# Patient Record
Sex: FEMALE | Race: WHITE | NOT HISPANIC OR LATINO | Employment: FULL TIME | URBAN - METROPOLITAN AREA
[De-identification: names, ages, dates, MRNs, and addresses within clinical notes are randomized per-mention and may not be internally consistent; named-entity substitution may affect disease eponyms.]

---

## 2018-07-09 ENCOUNTER — HISTORICAL (OUTPATIENT)
Dept: RADIOLOGY | Facility: HOSPITAL | Age: 48
End: 2018-07-09

## 2019-10-03 ENCOUNTER — OFFICE VISIT (OUTPATIENT)
Dept: ORTHOPEDICS | Facility: CLINIC | Age: 49
End: 2019-10-03
Payer: MEDICAID

## 2019-10-03 VITALS — TEMPERATURE: 98 F | BODY MASS INDEX: 30.36 KG/M2 | WEIGHT: 165 LBS | HEIGHT: 62 IN

## 2019-10-03 DIAGNOSIS — M77.11 LATERAL EPICONDYLITIS OF RIGHT ELBOW: Primary | ICD-10-CM

## 2019-10-03 DIAGNOSIS — G56.01 CARPAL TUNNEL SYNDROME OF RIGHT WRIST: ICD-10-CM

## 2019-10-03 PROCEDURE — 20551 TENDON ORIGIN: R ELBOW: ICD-10-PCS | Mod: 51,RT,S$GLB, | Performed by: ORTHOPAEDIC SURGERY

## 2019-10-03 PROCEDURE — 20551 NJX 1 TENDON ORIGIN/INSJ: CPT | Mod: 51,RT,S$GLB, | Performed by: ORTHOPAEDIC SURGERY

## 2019-10-03 PROCEDURE — 99202 OFFICE O/P NEW SF 15 MIN: CPT | Mod: 25,S$GLB,, | Performed by: ORTHOPAEDIC SURGERY

## 2019-10-03 PROCEDURE — 99202 PR OFFICE/OUTPT VISIT, NEW, LEVL II, 15-29 MIN: ICD-10-PCS | Mod: 25,S$GLB,, | Performed by: ORTHOPAEDIC SURGERY

## 2019-10-03 PROCEDURE — 20526 CARPAL TUNNEL: R RADIOCARPAL: ICD-10-PCS | Mod: 59,RT,S$GLB, | Performed by: ORTHOPAEDIC SURGERY

## 2019-10-03 PROCEDURE — 20526 THER INJECTION CARP TUNNEL: CPT | Mod: 59,RT,S$GLB, | Performed by: ORTHOPAEDIC SURGERY

## 2019-10-03 RX ORDER — ROPINIROLE 0.5 MG/1
0.5 TABLET, FILM COATED ORAL NIGHTLY
COMMUNITY
Start: 2019-09-30 | End: 2023-04-20

## 2019-10-03 RX ORDER — B-COMPLEX WITH VITAMIN C
1 TABLET ORAL DAILY
COMMUNITY
End: 2020-01-23

## 2019-10-03 RX ORDER — SERTRALINE HYDROCHLORIDE 100 MG/1
TABLET, FILM COATED ORAL
COMMUNITY
Start: 2019-09-23 | End: 2020-01-23

## 2019-10-03 RX ORDER — ATORVASTATIN CALCIUM 10 MG/1
TABLET, FILM COATED ORAL
COMMUNITY
Start: 2019-09-16 | End: 2020-01-23

## 2019-10-03 RX ORDER — QUETIAPINE FUMARATE 200 MG/1
TABLET, FILM COATED ORAL
COMMUNITY
Start: 2019-09-23 | End: 2020-01-23

## 2019-10-03 RX ORDER — NAPROXEN 500 MG/1
500 TABLET ORAL 2 TIMES DAILY
COMMUNITY
End: 2020-01-23

## 2019-10-03 RX ORDER — PROPRANOLOL HYDROCHLORIDE 10 MG/1
TABLET ORAL
COMMUNITY
Start: 2019-09-04

## 2019-10-03 RX ORDER — LISINOPRIL 10 MG/1
TABLET ORAL
COMMUNITY
Start: 2019-09-27 | End: 2020-01-23

## 2019-10-03 NOTE — PROGRESS NOTES
Subjective:      Patient ID: Sera Osuna is a 48 y.o. female.    Chief Complaint: Pain of the Right Wrist    HPI 48-year-old lady who comes in with right lateral elbow pain as well as numbness in the right hand.  She has EMG and nerve conduction studies compatible with carpal tunnel syndrome this is been going on for several months.  She has been wearing a splint without relief    Review of Systems   Constitution: Negative for fever and weight loss.   Cardiovascular: Positive for leg swelling. Negative for chest pain.   Musculoskeletal: Positive for joint pain. Negative for arthritis, joint swelling, muscle weakness and stiffness.   Gastrointestinal: Negative for change in bowel habit.   Genitourinary: Negative for bladder incontinence and hematuria.   Neurological: Positive for numbness, paresthesias and sensory change. Negative for focal weakness.         Objective:                        Right Hand/Wrist Exam     Inspection   Scars: Wrist - absent   Effusion: Wrist - absent     Range of Motion     Wrist   Extension: normal   Flexion: normal   Abduction: normal  Adduction: normal    Tests   Phalens sign: positive  Carpal Tunnel Compression Test: positive      Other     Neuorologic Exam    Median Distribution: abnormal      Right Elbow Exam     Inspection   Scars: absent  Effusion: absent    Pain   The patient exhibits pain of the extensor musculature and lateral epicondyle    Range of Motion   Extension: normal   Flexion: normal   Pronation: normal   Supination: normal     Tests   Tennis Elbow: mild    Other   Sensation: normal          Muscle Strength   Right Upper Extremity   Wrist extension: 5/5/5   Wrist flexion: 5/5/5   Elbow Pronation:  5/5   Elbow Supination:  5/5   Elbow Extension: 5/5  Elbow Flexion: 5/5              Assessment:       Encounter Diagnoses   Name Primary?    Lateral epicondylitis of right elbow Yes    Carpal tunnel syndrome of right wrist           Plan:       Sera was seen today  for pain.    Diagnoses and all orders for this visit:    Lateral epicondylitis of right elbow    Carpal tunnel syndrome of right wrist     The extensor origin of the right elbow and the right carpal tunnel or both injected today.  Return 1 month for recheck  Tendon Origin: R elbow  Date/Time: 10/3/2019 2:30 PM  Performed by: Jaret Adams MD  Authorized by: Jaret Adams MD     Location:  Elbow  Site:  R elbow  Prep: patient was prepped and draped in usual sterile fashion    Needle size:  25 G  Approach:  Anterolateral  Medications:  10 mg triamcinolone acetonide 10 mg/mL  Patient tolerance:  Patient tolerated the procedure well with no immediate complications  Carpal Tunnel: R radiocarpal  Date/Time: 10/3/2019 2:30 PM  Performed by: Jaret Adams MD  Authorized by: Jaret Adams MD       Location:  Wrist  Site:  R radiocarpal  Prep: Patient was prepped and draped in usual sterile fashion    Needle size:  25 G  Approach:  Volar  Medications:  10 mg triamcinolone acetonide 10 mg/mL  Patient tolerance:  Patient tolerated the procedure well with no immediate complications

## 2019-11-04 ENCOUNTER — OFFICE VISIT (OUTPATIENT)
Dept: ORTHOPEDICS | Facility: CLINIC | Age: 49
End: 2019-11-04
Payer: MEDICAID

## 2019-11-04 DIAGNOSIS — M77.11 LATERAL EPICONDYLITIS OF RIGHT ELBOW: Primary | ICD-10-CM

## 2019-11-04 DIAGNOSIS — G56.01 CARPAL TUNNEL SYNDROME OF RIGHT WRIST: ICD-10-CM

## 2019-11-04 PROCEDURE — 99213 PR OFFICE/OUTPT VISIT, EST, LEVL III, 20-29 MIN: ICD-10-PCS | Mod: S$GLB,,, | Performed by: ORTHOPAEDIC SURGERY

## 2019-11-04 PROCEDURE — 99213 OFFICE O/P EST LOW 20 MIN: CPT | Mod: S$GLB,,, | Performed by: ORTHOPAEDIC SURGERY

## 2019-11-04 NOTE — PROGRESS NOTES
Subjective:      Patient ID: Sera Osuna is a 48 y.o. female.    Chief Complaint: Pain of the Right Wrist    HPI patient is here for follow-up for her right carpal tunnel syndrome and lateral epicondylitis.  She states she is better following her injections.  The numbness has resolved in her hand although she still has some soreness in her palm.  Her right elbow pain is also better but is persistent    ROS  unchanged from prior visit  Objective:      Active and passive range of motion of her right elbow is normal.  She  with palpation of the extensor origin.    Examination of the right hand shows tenderness over the transverse carpal ligament.  She still has very slight decreased sensation in the median nerve distribution      Ortho/SPM Exam            Assessment:       Encounter Diagnoses   Name Primary?    Lateral epicondylitis of right elbow Yes    Carpal tunnel syndrome of right wrist           Plan:       Sera was seen today for pain.    Diagnoses and all orders for this visit:    Lateral epicondylitis of right elbow    Carpal tunnel syndrome of right wrist      She is placed on meloxicam.  She will contact if she fails to improve to arrange for another injection of her elbow and possible carpal tunnel release

## 2019-12-26 ENCOUNTER — OFFICE VISIT (OUTPATIENT)
Dept: ORTHOPEDICS | Facility: CLINIC | Age: 49
End: 2019-12-26
Payer: MEDICAID

## 2019-12-26 VITALS — BODY MASS INDEX: 30.52 KG/M2 | WEIGHT: 165.88 LBS | HEIGHT: 62 IN

## 2019-12-26 DIAGNOSIS — G56.01 CARPAL TUNNEL SYNDROME OF RIGHT WRIST: Primary | ICD-10-CM

## 2019-12-26 PROCEDURE — 99213 PR OFFICE/OUTPT VISIT, EST, LEVL III, 20-29 MIN: ICD-10-PCS | Mod: S$GLB,,, | Performed by: ORTHOPAEDIC SURGERY

## 2019-12-26 PROCEDURE — 99213 OFFICE O/P EST LOW 20 MIN: CPT | Mod: S$GLB,,, | Performed by: ORTHOPAEDIC SURGERY

## 2019-12-26 RX ORDER — CALCIUM CARBONATE/VITAMIN D3 500-10/5ML
400 LIQUID (ML) ORAL
COMMUNITY
Start: 2019-12-03 | End: 2020-01-23

## 2019-12-26 RX ORDER — QUETIAPINE FUMARATE 300 MG/1
TABLET, FILM COATED ORAL
COMMUNITY
Start: 2018-08-02 | End: 2023-04-20

## 2019-12-26 RX ORDER — FLUTICASONE PROPIONATE 50 MCG
SPRAY, SUSPENSION (ML) NASAL
COMMUNITY
Start: 2019-12-16

## 2019-12-26 RX ORDER — ONDANSETRON HYDROCHLORIDE 8 MG/1
TABLET, FILM COATED ORAL
COMMUNITY
Start: 2019-10-14

## 2019-12-26 RX ORDER — MELOXICAM 15 MG/1
TABLET ORAL
COMMUNITY
Start: 2019-11-05 | End: 2020-01-23

## 2019-12-26 RX ORDER — ATORVASTATIN CALCIUM 40 MG/1
40 TABLET, FILM COATED ORAL
COMMUNITY
Start: 2019-12-13 | End: 2020-01-23

## 2019-12-26 RX ORDER — PHENAZOPYRIDINE HYDROCHLORIDE 100 MG/1
TABLET, FILM COATED ORAL
COMMUNITY
Start: 2019-10-14 | End: 2020-01-23

## 2019-12-26 NOTE — PROGRESS NOTES
Subjective:      Patient ID: Sera Osuna is a 49 y.o. female.    Chief Complaint: Post-op Evaluation of the Right Hand    HPI 49-year-old lady with EMG and nerve conduction studies that show carpal tunnel syndrome on the right comes in today to schedule. She also has early symptoms of a right index finger trigger finger  Review of Systems   Constitution: Negative for fever and weight loss.   Cardiovascular: Negative for chest pain and leg swelling.   Musculoskeletal: Negative for arthritis, joint pain, joint swelling, muscle weakness and stiffness.   Gastrointestinal: Negative for change in bowel habit.   Genitourinary: Negative for bladder incontinence and hematuria.   Neurological: Positive for numbness, paresthesias and sensory change. Negative for focal weakness.         Objective:      Examination of the right wrist shows active and passive range of motion is normal. She has decreased sensation in the median nerve distribution.  She has a positive carpal compression test.    She is tender in the region of the A1 pulley of the right index finger and has a palpable tender nodule in this area      Ortho/SPM Exam            Assessment:       Encounter Diagnosis   Name Primary?    Carpal tunnel syndrome of right wrist Yes          Plan:       Sera was seen today for post-op evaluation.    Diagnoses and all orders for this visit:    Carpal tunnel syndrome of right wrist      She is ready to proceed with carpal tunnel release.  We can also inject the tendon sheath to the index finger at the same setting.

## 2019-12-31 LAB
BASOPHILS NFR SNV MANUAL: 0.5 % (ref 0–3)
BUN SERPL-MCNC: 21 MG/DL (ref 7–18)
BUN/CREAT SERPL: 27.63 RATIO (ref 7–18)
CALCIUM SERPL-MCNC: 9.1 MG/DL (ref 8.8–10.5)
CHLORIDE SERPL-SCNC: 102 MMOL/L (ref 100–108)
CO2 SERPL-SCNC: 29 MMOL/L (ref 21–32)
CREAT SERPL-MCNC: 0.76 MG/DL (ref 0.55–1.02)
EOSINOPHIL NFR SNV MANUAL: 3.1 % (ref 1–3)
ERYTHROCYTE [DISTWIDTH] IN BLOOD BY AUTOMATED COUNT: 12.5 % (ref 12.5–18)
GFR ESTIMATION: > 60
GLUCOSE SERPL-MCNC: 80 MG/DL (ref 70–110)
HCT VFR BLD AUTO: 38.9 % (ref 37–47)
HGB BLD-MCNC: 12.7 G/DL (ref 12–16)
LYMPHOCYTES NFR SNV MANUAL: 21.9 % (ref 25–40)
MANUAL NRBC PER 100 CELLS: 0 %
MCH RBC QN AUTO: 29.9 PG (ref 27–31.2)
MCHC RBC AUTO-ENTMCNC: 32.6 G/DL (ref 31.8–35.4)
MCV RBC AUTO: 91.5 FL (ref 80–97)
MONOCYTES/100 LEUKOCYTES: 6.5 % (ref 1–15)
NEUTROPHILS NFR BLD: 5.41 10*3/UL (ref 1.8–7.7)
NEUTROPHILS NFR SNV MANUAL: 67.6 % (ref 37–80)
PLATELETS: 186 10*3/UL (ref 142–424)
POTASSIUM SERPL-SCNC: 4.1 MMOL/L (ref 3.6–5.2)
RBC # BLD AUTO: 4.25 10*6/UL (ref 4.2–5.4)
SODIUM BLD-SCNC: 136 MMOL/L (ref 135–145)
WBC # BLD: 8 10*3/UL (ref 4.6–10.2)

## 2020-01-08 ENCOUNTER — OUTSIDE PLACE OF SERVICE (OUTPATIENT)
Dept: ORTHOPEDICS | Facility: CLINIC | Age: 50
End: 2020-01-08
Payer: MEDICAID

## 2020-01-08 PROCEDURE — 64721 PR REVISE MEDIAN N/CARPAL TUNNEL SURG: ICD-10-PCS | Mod: RT,,, | Performed by: ORTHOPAEDIC SURGERY

## 2020-01-08 PROCEDURE — 26055 PR INCISE FINGER TENDON SHEATH: ICD-10-PCS | Mod: 51,F6,, | Performed by: ORTHOPAEDIC SURGERY

## 2020-01-08 PROCEDURE — 64721 CARPAL TUNNEL SURGERY: CPT | Mod: RT,,, | Performed by: ORTHOPAEDIC SURGERY

## 2020-01-08 PROCEDURE — 26055 INCISE FINGER TENDON SHEATH: CPT | Mod: 51,F6,, | Performed by: ORTHOPAEDIC SURGERY

## 2020-01-14 ENCOUNTER — OFFICE VISIT (OUTPATIENT)
Dept: ORTHOPEDICS | Facility: CLINIC | Age: 50
End: 2020-01-14
Payer: MEDICAID

## 2020-01-14 DIAGNOSIS — G56.01 CARPAL TUNNEL SYNDROME OF RIGHT WRIST: Primary | ICD-10-CM

## 2020-01-14 PROCEDURE — 99024 POSTOP FOLLOW-UP VISIT: CPT | Mod: S$GLB,,, | Performed by: ORTHOPAEDIC SURGERY

## 2020-01-14 PROCEDURE — 99024 PR POST-OP FOLLOW-UP VISIT: ICD-10-PCS | Mod: S$GLB,,, | Performed by: ORTHOPAEDIC SURGERY

## 2020-01-14 NOTE — PROGRESS NOTES
Subjective:      Patient ID: Sera Osuna is a 49 y.o. female.    Chief Complaint: Post-op Evaluation of the Right Hand    HPI patient is 1 week postop right carpal tunnel release.  She states she is doing well unchanged from prior visit    ROS      Objective:      Incision is clean.  There is no drainage.  There is minimal swelling      Ortho/SPM Exam            Assessment:       Encounter Diagnosis   Name Primary?    Carpal tunnel syndrome of right wrist Yes          Plan:       Sera was seen today for post-op evaluation.    Diagnoses and all orders for this visit:    Carpal tunnel syndrome of right wrist     Her dressing is changed today. She will be seen back in 1 week for suture removal

## 2020-01-23 ENCOUNTER — OFFICE VISIT (OUTPATIENT)
Dept: ORTHOPEDICS | Facility: CLINIC | Age: 50
End: 2020-01-23
Payer: MEDICAID

## 2020-01-23 DIAGNOSIS — G56.01 CARPAL TUNNEL SYNDROME OF RIGHT WRIST: Primary | ICD-10-CM

## 2020-01-23 PROCEDURE — 99024 PR POST-OP FOLLOW-UP VISIT: ICD-10-PCS | Mod: S$GLB,,, | Performed by: ORTHOPAEDIC SURGERY

## 2020-01-23 PROCEDURE — 99024 POSTOP FOLLOW-UP VISIT: CPT | Mod: S$GLB,,, | Performed by: ORTHOPAEDIC SURGERY

## 2020-01-23 RX ORDER — DICLOFENAC SODIUM 75 MG/1
TABLET, DELAYED RELEASE ORAL
COMMUNITY
Start: 2020-01-17 | End: 2023-04-20

## 2020-01-23 RX ORDER — LISINOPRIL 10 MG/1
10 TABLET ORAL
COMMUNITY
Start: 2020-01-17 | End: 2023-02-24 | Stop reason: ALTCHOICE

## 2020-01-23 RX ORDER — ATORVASTATIN CALCIUM 20 MG/1
TABLET, FILM COATED ORAL
COMMUNITY
Start: 2020-01-10 | End: 2023-04-20

## 2020-01-23 RX ORDER — TIZANIDINE 4 MG/1
TABLET ORAL
COMMUNITY
Start: 2020-01-17 | End: 2023-04-20

## 2020-01-23 RX ORDER — SERTRALINE HYDROCHLORIDE 100 MG/1
100 TABLET, FILM COATED ORAL
COMMUNITY
Start: 2020-01-17 | End: 2023-04-20

## 2020-01-23 NOTE — PROGRESS NOTES
Subjective:      Patient ID: Sera Osuna is a 49 y.o. female.    Chief Complaint: Post-op Evaluation of the Right Hand    HPI patient is 2 weeks postop right carpal tunnel release.  Her chief complaint is  volar wrist pain where she has some bruising.  She also complains of pain in the region of the A1 pulley of her index finger.  She does not have triggering at this time    ROS unchanged from prior visit      Objective:      Incisions clean.  There is no drainage. She does have some mild bruising along the volar aspect of her wrist and forearm      Ortho/SPM Exam            Assessment:       Encounter Diagnosis   Name Primary?    Carpal tunnel syndrome of right wrist Yes          Plan:       Sera was seen today for post-op evaluation.    Diagnoses and all orders for this visit:    Carpal tunnel syndrome of right wrist     Sutures are removed today. She is given a work release for 3 February.  Return p.r.n.

## 2020-01-30 ENCOUNTER — TELEPHONE (OUTPATIENT)
Dept: ORTHOPEDICS | Facility: CLINIC | Age: 50
End: 2020-01-30

## 2020-01-30 NOTE — TELEPHONE ENCOUNTER
----- Message from Anabelle Osuna sent at 1/30/2020  9:00 AM CST -----  Wants to know if she can extend time off of work, does not feel like she can pass the physical.

## 2020-01-30 NOTE — TELEPHONE ENCOUNTER
1/30/20  11:10am   Spoke to patient.    Ok per Dr. Adams to be off work for 2 more weeks.  She will come by office to  excuse.

## 2021-02-09 LAB
CHOLEST SERPL-MCNC: 128 MG/DL (ref 0–200)
HDLC SERPL-MCNC: 43 MG/DL (ref 40–60)
LDLC SERPL CALC-MCNC: 57.8 MG/DL (ref 30–100)
TRIGL SERPL-MCNC: 130 MG/DL (ref 30–200)

## 2021-07-23 LAB
ALBUMIN SERPL-MCNC: 4.6 G/DL (ref 3.4–5)
ALBUMIN/GLOB SERPL: 1.6 {RATIO}
ALP SERPL-CCNC: 119 U/L (ref 50–144)
ALT SERPL-CCNC: 20 U/L (ref 1–45)
ANION GAP SERPL CALC-SCNC: 5 MMOL/L (ref 7–16)
AST SERPL-CCNC: 24 U/L (ref 14–36)
BASOPHILS # BLD AUTO: 0.04 X10(3)/MCL (ref 0.01–0.08)
BASOPHILS NFR BLD AUTO: 0.5 % (ref 0.1–1.2)
BILIRUB SERPL-MCNC: 0.36 MG/DL (ref 0.1–1)
BUN SERPL-MCNC: 20 MG/DL (ref 7–20)
CALCIUM SERPL-MCNC: 9.4 MG/DL (ref 8.4–10.2)
CHLORIDE SERPL-SCNC: 104 MMOL/L (ref 94–110)
CHOLEST SERPL-MCNC: 126 MG/DL (ref 0–200)
CO2 SERPL-SCNC: 27 MMOL/L (ref 21–32)
CREAT SERPL-MCNC: 0.9 MG/DL (ref 0.52–1.04)
CREAT/UREA NIT SERPL: 22.2 (ref 12–20)
EOSINOPHIL # BLD AUTO: 0.25 X10(3)/MCL (ref 0.04–0.36)
EOSINOPHIL NFR BLD AUTO: 3.3 % (ref 0.7–7)
ERYTHROCYTE [DISTWIDTH] IN BLOOD BY AUTOMATED COUNT: 13 % (ref 11–14.5)
EST. AVERAGE GLUCOSE BLD GHB EST-MCNC: 102 MG/DL (ref 70–115)
GLOBULIN SER-MCNC: 2.8 G/DL (ref 2–3.9)
GLUCOSE SERPL-MCNC: 100 MGM./DL (ref 70–115)
HBA1C MFR BLD: 5.4 % (ref 4–6)
HCT VFR BLD AUTO: 39 % (ref 36–48)
HDLC SERPL-MCNC: 43 MG/DL (ref 40–60)
HGB BLD-MCNC: 12.9 G/DL (ref 11.8–16)
IMM GRANULOCYTES # BLD AUTO: 0.03 X10E3/UL (ref 0–0.03)
IMM GRANULOCYTES NFR BLD AUTO: 0.4 % (ref 0–0.5)
LDLC SERPL CALC-MCNC: 51.8 MG/DL (ref 30–100)
LYMPHOCYTES # BLD AUTO: 1.75 X10(3)/MCL (ref 1.16–3.74)
LYMPHOCYTES NFR BLD AUTO: 23.4 % (ref 20–55)
MCH RBC QN AUTO: 29.5 PG (ref 27–34)
MCHC RBC AUTO-ENTMCNC: 33.1 G/DL (ref 31–37)
MCV RBC AUTO: 89.2 FL (ref 79–99)
MONOCYTES # BLD AUTO: 0.5 X10(3)/MCL (ref 0.24–0.36)
MONOCYTES NFR BLD AUTO: 6.7 % (ref 4.7–12.5)
NEUTROPHILS # BLD AUTO: 4.91 X10(3)/MCL (ref 1.56–6.13)
NEUTROPHILS NFR BLD AUTO: 65.7 % (ref 37–73)
PLATELET # BLD AUTO: 183 X10(3)/MCL (ref 140–371)
PMV BLD AUTO: 11.2 FL (ref 9.4–12.4)
POTASSIUM SERPL-SCNC: 4.3 MMOL/L (ref 3.5–5.1)
PROT SERPL-MCNC: 7.4 G/DL (ref 6.3–8.2)
RBC # BLD AUTO: 4.37 X10(6)/MCL (ref 4–5.1)
SODIUM SERPL-SCNC: 136 MMOL/L (ref 135–145)
TRIGL SERPL-MCNC: 165 MG/DL (ref 30–200)
TSH SERPL-ACNC: 1.76 UIU/ML (ref 0.36–3.74)
WBC # SPEC AUTO: 7.5 X10(3)/MCL (ref 4–11.5)

## 2021-09-15 LAB
ABS NEUT (OLG): 4.9 X10(3)/MCL (ref 1.5–6.9)
ALBUMIN SERPL-MCNC: 4.1 GM/DL (ref 3.5–5)
ALBUMIN/GLOB SERPL: 1.1 RATIO (ref 1.1–2)
ALP SERPL-CCNC: 98 UNIT/L (ref 40–150)
ALT SERPL-CCNC: 14 UNIT/L (ref 0–55)
APTT PPP: 31 SEC (ref 23.4–34.9)
AST SERPL-CCNC: 15 UNIT/L (ref 5–34)
BASOPHILS # BLD AUTO: 0 X10(3)/MCL (ref 0–0.1)
BASOPHILS NFR BLD AUTO: 0 % (ref 0–1)
BILIRUB SERPL-MCNC: 0.4 MG/DL
BILIRUBIN DIRECT+TOT PNL SERPL-MCNC: 0.2 MG/DL (ref 0–0.5)
BILIRUBIN DIRECT+TOT PNL SERPL-MCNC: 0.2 MG/DL (ref 0–0.8)
BUN SERPL-MCNC: 10 MG/DL (ref 9.8–20.1)
CALCIUM SERPL-MCNC: 9.4 MG/DL (ref 8.4–10.2)
CHLORIDE SERPL-SCNC: 107 MMOL/L (ref 98–107)
CO2 SERPL-SCNC: 25 MMOL/L (ref 22–29)
CREAT SERPL-MCNC: 0.88 MG/DL (ref 0.55–1.02)
EOSINOPHIL # BLD AUTO: 0.2 X10(3)/MCL (ref 0–0.6)
EOSINOPHIL NFR BLD AUTO: 2 % (ref 0–5)
ERYTHROCYTE [DISTWIDTH] IN BLOOD BY AUTOMATED COUNT: 12.8 % (ref 11.5–17)
EST CREAT CLEARANCE SER (OHS): 90.31 ML/MIN
GLOBULIN SER-MCNC: 3.6 GM/DL (ref 2.4–3.5)
GLUCOSE SERPL-MCNC: 99 MG/DL (ref 74–100)
HCT VFR BLD AUTO: 38.4 % (ref 36–48)
HGB BLD-MCNC: 12.7 GM/DL (ref 12–16)
IMM GRANULOCYTES # BLD AUTO: 0.03 10*3/UL (ref 0–0.02)
IMM GRANULOCYTES NFR BLD AUTO: 0.4 % (ref 0–0.43)
INR PPP: 1 (ref 2–3)
LYMPHOCYTES # BLD AUTO: 1.5 X10(3)/MCL (ref 0.5–4.1)
LYMPHOCYTES NFR BLD AUTO: 22 % (ref 15–40)
MCH RBC QN AUTO: 30 PG (ref 27–34)
MCHC RBC AUTO-ENTMCNC: 33 GM/DL (ref 31–36)
MCV RBC AUTO: 90 FL (ref 80–99)
MONOCYTES # BLD AUTO: 0.4 X10(3)/MCL (ref 0–1.1)
MONOCYTES NFR BLD AUTO: 5 % (ref 4–12)
NEUTROPHILS # BLD AUTO: 4.9 X10(3)/MCL (ref 1.5–6.9)
NEUTROPHILS NFR BLD AUTO: 70 % (ref 43–75)
PLATELET # BLD AUTO: 162 X10(3)/MCL (ref 140–400)
PMV BLD AUTO: 11.7 FL (ref 6.8–10)
POTASSIUM SERPL-SCNC: 3.6 MMOL/L (ref 3.5–5.1)
PROT SERPL-MCNC: 7.7 GM/DL (ref 6.4–8.3)
PROTHROMBIN TIME: 13 SEC (ref 11.7–14.5)
RBC # BLD AUTO: 4.26 X10(6)/MCL (ref 4.2–5.4)
SODIUM SERPL-SCNC: 140 MMOL/L (ref 136–145)
WBC # SPEC AUTO: 7 X10(3)/MCL (ref 4.5–11.5)

## 2021-09-17 ENCOUNTER — HISTORICAL (OUTPATIENT)
Dept: ANESTHESIOLOGY | Facility: HOSPITAL | Age: 51
End: 2021-09-17

## 2021-09-17 LAB — CRC RECOMMENDATION EXT: NORMAL

## 2021-09-22 LAB — BCS RECOMMENDATION EXT: NORMAL

## 2022-01-29 ENCOUNTER — HISTORICAL (OUTPATIENT)
Dept: ADMINISTRATIVE | Facility: HOSPITAL | Age: 52
End: 2022-01-29

## 2022-04-07 ENCOUNTER — HISTORICAL (OUTPATIENT)
Dept: ADMINISTRATIVE | Facility: HOSPITAL | Age: 52
End: 2022-04-07
Payer: MEDICAID

## 2022-04-24 VITALS
DIASTOLIC BLOOD PRESSURE: 84 MMHG | SYSTOLIC BLOOD PRESSURE: 128 MMHG | OXYGEN SATURATION: 99 % | HEIGHT: 63 IN | BODY MASS INDEX: 30.66 KG/M2 | WEIGHT: 173.06 LBS

## 2022-04-27 ENCOUNTER — HISTORICAL (OUTPATIENT)
Dept: ADMINISTRATIVE | Facility: HOSPITAL | Age: 52
End: 2022-04-27
Payer: MEDICAID

## 2022-04-28 NOTE — OP NOTE
REFERRING PHYSICIAN:  Louis Garnett    ADMITTING DIAGNOSIS:  Need for age-appropriate screening colonoscopy.    PROCEDURE:  Colonoscopy to the cecum with intubation of ileocecal valve.    POSTOPERATIVE DIAGNOSES:    1. Normal terminal ileum up to 20 cm.  2. Normal colonoscopy.  3. Grade 1 to 2 internal hemorrhoids.    INDICATIONS FOR PROCEDURE:  Patient is a 50-year-old  female with hypertension, hypercholesterolemia, bipolar disorder, and anxiety.  She has sciatica and piriformis syndrome, smokes about a half pack a day for 35 years and denies any blood in the stool.  No family history of colon cancer.  She was referred to me by Ms. Louis Garnett for age-appropriate screening colonoscopy, had not had one in the past.    DESCRIPTION OF PROCEDURE:  Patient was brought to the GI suite, underwent sedation and examination of the colon all the way to the cecum with intubation of ileocecal valve.     Terminal ileum was normal up to 10 cm.  Cecum, ascending colon, transverse colon and descending colon were normal.  Rectosigmoid was unremarkable.  Digital exam reveals good tone, no masses.  Grade 1 to 2 internal hemorrhoids were seen.  No other pathology was visualized.  Overall, she did very well and had no problems or difficulties.    PLAN:    1. Follow up in the office in a week to discuss results.  2. Follow up in 2 years, recheck stools for blood.  3. Follow up in 10 years, repeat screening colonoscopy.       I appreciate the consultation referral from Ms. Chenrne and will notify her of my findings.        SEJAL/ABBY   DD: 09/17/2021 1051   DT: 09/17/2021 1104  Job # [id number]    cc: Louis Garnett

## 2023-01-23 ENCOUNTER — TELEPHONE (OUTPATIENT)
Dept: FAMILY MEDICINE | Facility: CLINIC | Age: 53
End: 2023-01-23
Payer: MEDICAID

## 2023-01-23 ENCOUNTER — DOCUMENTATION ONLY (OUTPATIENT)
Dept: FAMILY MEDICINE | Facility: CLINIC | Age: 53
End: 2023-01-23
Payer: MEDICAID

## 2023-02-02 ENCOUNTER — DOCUMENTATION ONLY (OUTPATIENT)
Dept: ADMINISTRATIVE | Facility: HOSPITAL | Age: 53
End: 2023-02-02
Payer: MEDICAID

## 2023-02-23 NOTE — PROGRESS NOTES
//Chief Complaint: Annual exam    Chief Complaint   Patient presents with    Well Woman            HPI:   52 y.o. WF   s/p KAREN ovary sparing, presents for an annual gyn exam.  Pt c/o burning with urination x 3 days and discharge, denies vaginal itching. Pt admits she seldom drinks water.    FmHx: negative for breast, uterine, ovarian, and colon cancers.       Labs / Significant Studies:  Last Mammogram 2021, benign     Family History   Problem Relation Age of Onset    No Known Problems Paternal Grandfather     No Known Problems Paternal Grandmother     No Known Problems Maternal Grandmother     No Known Problems Maternal Grandfather     Heart disease Father     Hypertension Father     Hyperlipidemia Father     No Known Problems Mother          Past Medical History:   Diagnosis Date    Anxiety     Depression     Heart murmur     Hypercholesteremia     Hypertension     Restless leg syndrome      Past Surgical History:   Procedure Laterality Date    APPENDECTOMY      BREAST LIFT       SECTION      ENDOMETRIAL ABLATION      SHOULDER SURGERY Right     Rotator Cuff Repair    TOTAL ABDOMINAL HYSTERECTOMY      with ovary sparing       Current Outpatient Medications:     atorvastatin (LIPITOR) 20 MG tablet, , Disp: , Rfl:     fluticasone propionate (FLONASE) 50 mcg/actuation nasal spray, , Disp: , Rfl:     olmesartan (BENICAR) 40 MG tablet, Take 40 mg by mouth., Disp: , Rfl:     pantoprazole (PROTONIX) 40 MG tablet, Take 40 mg by mouth., Disp: , Rfl:     propranolol (INDERAL) 10 MG tablet, , Disp: , Rfl:     QUEtiapine (SEROQUEL) 300 MG Tab, Take by mouth., Disp: , Rfl:     cyanocobalamin, vitamin B-12, (VITAMIN B-12 ORAL),  0 Refill(s), Disp: , Rfl:     diclofenac (VOLTAREN) 75 MG EC tablet, , Disp: , Rfl:     ondansetron (ZOFRAN) 8 MG tablet, , Disp: , Rfl:     rOPINIRole (REQUIP) 0.5 MG tablet, 0.5 mg every evening., Disp: , Rfl:     sertraline (ZOLOFT) 100 MG tablet, Take 100 mg by mouth., Disp: ,  Rfl:     tiZANidine (ZANAFLEX) 4 MG tablet, , Disp: , Rfl:     Review of patient's allergies indicates:  No Known Allergies    Social History     Tobacco Use    Smoking status: Every Day     Packs/day: 0.50     Years: 15.00     Pack years: 7.50     Types: Cigarettes    Smokeless tobacco: Never   Substance Use Topics    Alcohol use: Yes    Drug use: Never         Review of Systems   Constitutional:  Negative for appetite change, chills, fatigue, fever and unexpected weight change.   Respiratory:  Negative for cough, shortness of breath and wheezing.    Cardiovascular:  Negative for chest pain, palpitations and leg swelling.   Gastrointestinal:  Negative for abdominal pain, blood in stool, constipation, diarrhea, nausea, vomiting and reflux.   Endocrine: Negative for diabetes, hair loss, hot flashes, hyperthyroidism and hypothyroidism.   Genitourinary:  Positive for dysuria and vaginal discharge. Negative for bladder incontinence, decreased libido, dysmenorrhea, dyspareunia, flank pain, frequency, genital sores, hematuria, hot flashes, menorrhagia, menstrual problem, pelvic pain, urgency, vaginal bleeding, vaginal pain, urinary incontinence, postcoital bleeding, postmenopausal bleeding, vaginal dryness and vaginal odor.   Integumentary:  Negative for rash, acne, hair changes, mole/lesion, breast mass, nipple discharge, breast skin changes and breast tenderness.   Neurological:  Negative for headaches.   Psychiatric/Behavioral:  Negative for depression and sleep disturbance. The patient is not nervous/anxious.    Breast: Negative for lump, mass, mastodynia, nipple discharge, skin changes and tenderness     Physical Exam:   Vitals:    02/24/23 0822   BP: 118/74   Temp: 98.1 °F (36.7 °C)     Body mass index is 33.23 kg/m².    Physical Exam   Constitutional: She is oriented to person, place, and time. Vital signs are normal. She appears well-developed and well-nourished.   Neck: No thyroid mass present.   Cardiovascular:  Normal rate, regular rhythm, normal heart sounds and normal pulses.   No murmur heard.  Pulmonary/Chest: Breath sounds normal. No respiratory distress. She has no decreased breath sounds. She has no wheezes. She has no rhonchi. She has no rales. She exhibits no retraction. Right breast exhibits no inverted nipple, no mass, no nipple discharge, no skin change and no tenderness. Left breast exhibits no inverted nipple, no mass, no nipple discharge, no skin change and no tenderness.   Abdominal: Bowel sounds are normal. She exhibits no mass. There is no abdominal tenderness. No hernia.   Genitourinary: Vagina normal. Rectal exam shows no external hemorrhoid and no tenderness. No erythema, tenderness, rectocele, cystocele or atrophy in the vagina. Right adnexum displays no mass, no tenderness and no fullness. Left adnexum displays no mass, no tenderness and no fullness. Uterus is absent.   Musculoskeletal:      Cervical back: No edema.      Right lower leg: No edema.      Left lower leg: No edema.   Lymphadenopathy:        Head (right side): No submandibular and no preauricular adenopathy present.        Head (left side): No submandibular and no preauricular adenopathy present.        Right: No supraclavicular adenopathy present.        Left: No supraclavicular adenopathy present.   Neurological: She is alert and oriented to person, place, and time.   Skin: Skin is warm and dry. No rash noted. No erythema. No pallor.   Psychiatric: She has a normal mood and affect. Her behavior is normal. Mood and thought content normal. Her mood appears not anxious. She does not exhibit a depressed mood. She expresses no homicidal and no suicidal ideation. She expresses no suicidal plans and no homicidal plans.        Assessment:     There is no problem list on file for this patient.      Health Maintenance Due   Topic Date Due    Hepatitis C Screening  Never done    COVID-19 Vaccine (1) Never done    Pneumococcal Vaccines (Age 0-64)  (1 - PCV) Never done    HIV Screening  Never done    TETANUS VACCINE  Never done    Shingles Vaccine (1 of 2) Never done    Influenza Vaccine (1) Never done    Mammogram  09/22/2022     Health Maintenance Topics with due status: Not Due       Topic Last Completion Date    Hemoglobin A1c (Diabetic Prevention Screening) 07/23/2021    Lipid Panel 07/23/2021    Colorectal Cancer Screening 09/17/2021         Plan:  Sera was seen today for well woman.    Diagnoses and all orders for this visit:    Abnormal gynecological examination  PAP  Counseled regarding contraceptive options, and need for contraception until no menses for 1 year.  Reviewed calcium needs, exercise, and prevention of osteoporosis.  Healthy diet and light weightbearing exercise if tolerated.  Reviewed normal perimenopausal transition.  Mammogram recommended yearly.  Colonoscopy recommended at age 50.  RTC 1 y    Vaginal discharge  -     MDL Sendout Test   One Swab GC/CZ/TV    Hematuria, unspecified type  - Advised pt to increase water intake.     Dysuria  -     POCT Urinalysis, Dipstick, Automated, W/O Scope    S/P KAREN (total abdominal hysterectomy)    Decreased libido  FSH and Estradiol labs ordered. Pt to RTC to discuss results        Josefa Viveros

## 2023-02-24 ENCOUNTER — OFFICE VISIT (OUTPATIENT)
Dept: OBSTETRICS AND GYNECOLOGY | Facility: CLINIC | Age: 53
End: 2023-02-24
Payer: MEDICAID

## 2023-02-24 VITALS
DIASTOLIC BLOOD PRESSURE: 74 MMHG | WEIGHT: 185.19 LBS | SYSTOLIC BLOOD PRESSURE: 118 MMHG | HEIGHT: 63 IN | BODY MASS INDEX: 32.81 KG/M2 | TEMPERATURE: 98 F

## 2023-02-24 DIAGNOSIS — Z01.411 ABNORMAL GYNECOLOGICAL EXAMINATION: Primary | ICD-10-CM

## 2023-02-24 DIAGNOSIS — N89.8 VAGINAL DISCHARGE: ICD-10-CM

## 2023-02-24 DIAGNOSIS — R30.0 DYSURIA: ICD-10-CM

## 2023-02-24 DIAGNOSIS — Z90.710 S/P TAH (TOTAL ABDOMINAL HYSTERECTOMY): ICD-10-CM

## 2023-02-24 DIAGNOSIS — R31.9 HEMATURIA, UNSPECIFIED TYPE: ICD-10-CM

## 2023-02-24 DIAGNOSIS — R68.82 DECREASED LIBIDO: ICD-10-CM

## 2023-02-24 LAB
BILIRUB UR QL STRIP: NEGATIVE
GLUCOSE UR QL STRIP: NEGATIVE
KETONES UR QL STRIP: NEGATIVE
LEUKOCYTE ESTERASE UR QL STRIP: NEGATIVE
PH, POC UA: 5.5
POC BLOOD, URINE: POSITIVE
POC NITRATES, URINE: NEGATIVE
PROT UR QL STRIP: NEGATIVE
SP GR UR STRIP: 1.02 (ref 1–1.03)
UROBILINOGEN UR STRIP-ACNC: 0.2 (ref 0.1–1.1)

## 2023-02-24 PROCEDURE — 1159F MED LIST DOCD IN RCRD: CPT | Mod: CPTII,,, | Performed by: NURSE PRACTITIONER

## 2023-02-24 PROCEDURE — 4010F PR ACE/ARB THEARPY RXD/TAKEN: ICD-10-PCS | Mod: CPTII,,, | Performed by: NURSE PRACTITIONER

## 2023-02-24 PROCEDURE — 1160F PR REVIEW ALL MEDS BY PRESCRIBER/CLIN PHARMACIST DOCUMENTED: ICD-10-PCS | Mod: CPTII,,, | Performed by: NURSE PRACTITIONER

## 2023-02-24 PROCEDURE — 4010F ACE/ARB THERAPY RXD/TAKEN: CPT | Mod: CPTII,,, | Performed by: NURSE PRACTITIONER

## 2023-02-24 PROCEDURE — 3074F PR MOST RECENT SYSTOLIC BLOOD PRESSURE < 130 MM HG: ICD-10-PCS | Mod: CPTII,,, | Performed by: NURSE PRACTITIONER

## 2023-02-24 PROCEDURE — 3078F DIAST BP <80 MM HG: CPT | Mod: CPTII,,, | Performed by: NURSE PRACTITIONER

## 2023-02-24 PROCEDURE — 1159F PR MEDICATION LIST DOCUMENTED IN MEDICAL RECORD: ICD-10-PCS | Mod: CPTII,,, | Performed by: NURSE PRACTITIONER

## 2023-02-24 PROCEDURE — 99396 PR PREVENTIVE VISIT,EST,40-64: ICD-10-PCS | Mod: 25,,, | Performed by: NURSE PRACTITIONER

## 2023-02-24 PROCEDURE — 3074F SYST BP LT 130 MM HG: CPT | Mod: CPTII,,, | Performed by: NURSE PRACTITIONER

## 2023-02-24 PROCEDURE — 81003 URINALYSIS AUTO W/O SCOPE: CPT | Mod: QW,,, | Performed by: NURSE PRACTITIONER

## 2023-02-24 PROCEDURE — 1160F RVW MEDS BY RX/DR IN RCRD: CPT | Mod: CPTII,,, | Performed by: NURSE PRACTITIONER

## 2023-02-24 PROCEDURE — 3078F PR MOST RECENT DIASTOLIC BLOOD PRESSURE < 80 MM HG: ICD-10-PCS | Mod: CPTII,,, | Performed by: NURSE PRACTITIONER

## 2023-02-24 PROCEDURE — 3008F PR BODY MASS INDEX (BMI) DOCUMENTED: ICD-10-PCS | Mod: CPTII,,, | Performed by: NURSE PRACTITIONER

## 2023-02-24 PROCEDURE — 99396 PREV VISIT EST AGE 40-64: CPT | Mod: 25,,, | Performed by: NURSE PRACTITIONER

## 2023-02-24 PROCEDURE — 3008F BODY MASS INDEX DOCD: CPT | Mod: CPTII,,, | Performed by: NURSE PRACTITIONER

## 2023-02-24 PROCEDURE — 81003 POCT URINALYSIS, DIPSTICK, AUTOMATED, W/O SCOPE: ICD-10-PCS | Mod: QW,,, | Performed by: NURSE PRACTITIONER

## 2023-02-24 RX ORDER — PANTOPRAZOLE SODIUM 40 MG/1
40 TABLET, DELAYED RELEASE ORAL
COMMUNITY
Start: 2021-10-19 | End: 2023-04-20

## 2023-02-24 RX ORDER — OLMESARTAN MEDOXOMIL 40 MG/1
40 TABLET ORAL
COMMUNITY
Start: 2023-02-03

## 2023-02-28 ENCOUNTER — TELEPHONE (OUTPATIENT)
Dept: OBSTETRICS AND GYNECOLOGY | Facility: CLINIC | Age: 53
End: 2023-02-28
Payer: MEDICAID

## 2023-02-28 DIAGNOSIS — Z12.31 BREAST CANCER SCREENING BY MAMMOGRAM: Primary | ICD-10-CM

## 2023-02-28 NOTE — TELEPHONE ENCOUNTER
----- Message from Angeles Kelsey sent at 2/28/2023  3:04 PM CST -----  Regarding: Mammo order  .Type:  Mammogram    Caller is requesting to schedule their annual mammogram appointment.  Please enter order and contact patient to schedule.  Name of Caller: patient  Where would they like the mammogram performed? [facility]  Would the patient rather a call back or a response via [facility]?  Call when sent  Best Call Back Number: [phone]  Additional Information: patient went for bloodwork/mammo but mammo order was not in

## 2023-03-03 ENCOUNTER — HOSPITAL ENCOUNTER (OUTPATIENT)
Dept: RADIOLOGY | Facility: HOSPITAL | Age: 53
Discharge: HOME | End: 2023-03-03
Attending: NURSE PRACTITIONER
Payer: MEDICAID

## 2023-03-03 VITALS — HEIGHT: 62 IN | BODY MASS INDEX: 34.04 KG/M2 | WEIGHT: 185 LBS

## 2023-03-03 DIAGNOSIS — Z12.31 BREAST CANCER SCREENING BY MAMMOGRAM: ICD-10-CM

## 2023-03-03 PROCEDURE — 77067 SCR MAMMO BI INCL CAD: CPT | Mod: TC

## 2023-04-14 ENCOUNTER — LAB VISIT (OUTPATIENT)
Dept: LAB | Facility: HOSPITAL | Age: 53
End: 2023-04-14
Attending: NURSE PRACTITIONER
Payer: MEDICAID

## 2023-04-14 DIAGNOSIS — Z36.3 ENCOUNTER FOR ROUTINE SCREENING FOR MALFORMATION USING ULTRASONICS: ICD-10-CM

## 2023-04-14 DIAGNOSIS — Z79.899 ENCOUNTER FOR LONG-TERM (CURRENT) USE OF OTHER MEDICATIONS: Primary | ICD-10-CM

## 2023-04-14 DIAGNOSIS — T43.595A HYPERPARATHYROIDISM DUE TO LITHIUM THERAPY: ICD-10-CM

## 2023-04-14 DIAGNOSIS — E21.1 HYPERPARATHYROIDISM DUE TO LITHIUM THERAPY: ICD-10-CM

## 2023-04-14 LAB
ALBUMIN SERPL-MCNC: 4.3 G/DL (ref 3.4–5)
ALBUMIN/GLOB SERPL: 1.5 RATIO
ALP SERPL-CCNC: 137 UNIT/L (ref 50–144)
ALT SERPL-CCNC: 32 UNIT/L (ref 1–45)
ANION GAP SERPL CALC-SCNC: 6 MEQ/L (ref 2–13)
AST SERPL-CCNC: 32 UNIT/L (ref 14–36)
BASOPHILS # BLD AUTO: 0.06 X10(3)/MCL (ref 0.01–0.08)
BASOPHILS NFR BLD AUTO: 0.8 % (ref 0.1–1.2)
BILIRUBIN DIRECT+TOT PNL SERPL-MCNC: 0.5 MG/DL (ref 0–1)
BUN SERPL-MCNC: 11 MG/DL (ref 7–20)
CALCIUM SERPL-MCNC: 9.4 MG/DL (ref 8.4–10.2)
CHLORIDE SERPL-SCNC: 107 MMOL/L (ref 98–110)
CHOLEST SERPL-MCNC: 137 MG/DL (ref 0–200)
CO2 SERPL-SCNC: 28 MMOL/L (ref 21–32)
CREAT SERPL-MCNC: 0.88 MG/DL (ref 0.66–1.25)
CREAT/UREA NIT SERPL: 13 (ref 12–20)
EOSINOPHIL # BLD AUTO: 0.31 X10(3)/MCL (ref 0.04–0.36)
EOSINOPHIL NFR BLD AUTO: 4.3 % (ref 0.7–7)
ERYTHROCYTE [DISTWIDTH] IN BLOOD BY AUTOMATED COUNT: 12.9 % (ref 11–14.5)
EST. AVERAGE GLUCOSE BLD GHB EST-MCNC: 111.2 MG/DL (ref 70–115)
GFR SERPLBLD CREATININE-BSD FMLA CKD-EPI: 79 MLS/MIN/1.73/M2
GLOBULIN SER-MCNC: 2.9 GM/DL (ref 2–3.9)
GLUCOSE SERPL-MCNC: 112 MG/DL (ref 70–115)
HBA1C MFR BLD: 5.5 % (ref 4–6)
HCT VFR BLD AUTO: 41.5 % (ref 36–48)
HDLC SERPL-MCNC: 45 MG/DL (ref 40–60)
HGB BLD-MCNC: 13.6 G/DL (ref 11.8–16)
IMM GRANULOCYTES # BLD AUTO: 0.02 X10(3)/MCL (ref 0–0.03)
IMM GRANULOCYTES NFR BLD AUTO: 0.3 % (ref 0–0.5)
LDLC SERPL DIRECT ASSAY-SCNC: 65.7 MG/DL (ref 30–100)
LYMPHOCYTES # BLD AUTO: 1.82 X10(3)/MCL (ref 1.16–3.74)
LYMPHOCYTES NFR BLD AUTO: 25.4 % (ref 20–55)
MCH RBC QN AUTO: 29.8 PG (ref 27–34)
MCV RBC AUTO: 90.8 FL (ref 79–99)
MEAN CELL HEMOGLOBIN CONCENTRATION (OHS) G/DL: 32.8 G/DL (ref 31–37)
MONOCYTES # BLD AUTO: 0.41 X10(3)/MCL (ref 0.24–0.36)
MONOCYTES NFR BLD AUTO: 5.7 % (ref 4.7–12.5)
NEUTROPHILS # BLD AUTO: 4.54 X10(3)/MCL (ref 1.56–6.13)
NEUTROPHILS NFR BLD AUTO: 63.5 % (ref 37–73)
NRBC BLD AUTO-RTO: 0 % (ref 0–1)
PLATELET # BLD AUTO: 221 X10(3)/MCL (ref 140–371)
PMV BLD AUTO: 11.6 FL (ref 9.4–12.4)
POTASSIUM SERPL-SCNC: 4.2 MMOL/L (ref 3.5–5.1)
PROT SERPL-MCNC: 7.2 GM/DL (ref 6.3–8.2)
RBC # BLD AUTO: 4.57 X10(6)/MCL (ref 4–5.1)
SODIUM SERPL-SCNC: 141 MMOL/L (ref 135–145)
TRIGL SERPL-MCNC: 121 MG/DL (ref 30–200)
WBC # SPEC AUTO: 7.2 X10(3)/MCL (ref 4–11.5)

## 2023-04-14 PROCEDURE — 85025 COMPLETE CBC W/AUTO DIFF WBC: CPT

## 2023-04-14 PROCEDURE — 36415 COLL VENOUS BLD VENIPUNCTURE: CPT

## 2023-04-14 PROCEDURE — 80053 COMPREHEN METABOLIC PANEL: CPT

## 2023-04-14 PROCEDURE — 83036 HEMOGLOBIN GLYCOSYLATED A1C: CPT

## 2023-04-14 PROCEDURE — 80061 LIPID PANEL: CPT

## 2023-04-20 ENCOUNTER — OFFICE VISIT (OUTPATIENT)
Dept: FAMILY MEDICINE | Facility: CLINIC | Age: 53
End: 2023-04-20
Payer: MEDICAID

## 2023-04-20 VITALS
HEART RATE: 73 BPM | WEIGHT: 189.38 LBS | HEIGHT: 62 IN | TEMPERATURE: 98 F | SYSTOLIC BLOOD PRESSURE: 110 MMHG | DIASTOLIC BLOOD PRESSURE: 70 MMHG | OXYGEN SATURATION: 98 % | BODY MASS INDEX: 34.85 KG/M2

## 2023-04-20 DIAGNOSIS — I10 PRIMARY HYPERTENSION: ICD-10-CM

## 2023-04-20 DIAGNOSIS — Z00.01 ABNORMAL WELLNESS EXAM: ICD-10-CM

## 2023-04-20 DIAGNOSIS — R40.0 HAS DAYTIME DROWSINESS: ICD-10-CM

## 2023-04-20 DIAGNOSIS — E66.09 CLASS 1 OBESITY DUE TO EXCESS CALORIES WITH SERIOUS COMORBIDITY AND BODY MASS INDEX (BMI) OF 34.0 TO 34.9 IN ADULT: Primary | ICD-10-CM

## 2023-04-20 DIAGNOSIS — E78.2 MIXED HYPERLIPIDEMIA: ICD-10-CM

## 2023-04-20 DIAGNOSIS — G47.10 HYPERSOMNIA: ICD-10-CM

## 2023-04-20 DIAGNOSIS — R73.03 PREDIABETES: ICD-10-CM

## 2023-04-20 DIAGNOSIS — R06.83 SNORES: ICD-10-CM

## 2023-04-20 DIAGNOSIS — R06.81 WITNESSED APNEIC SPELLS: ICD-10-CM

## 2023-04-20 PROBLEM — R01.1 HEART MURMUR: Status: ACTIVE | Noted: 2023-04-20

## 2023-04-20 PROBLEM — E66.811 CLASS 1 OBESITY DUE TO EXCESS CALORIES WITH SERIOUS COMORBIDITY AND BODY MASS INDEX (BMI) OF 34.0 TO 34.9 IN ADULT: Status: ACTIVE | Noted: 2023-04-20

## 2023-04-20 PROBLEM — M54.16 LUMBAR RADICULOPATHY: Status: ACTIVE | Noted: 2023-04-20

## 2023-04-20 PROBLEM — G57.00 PIRIFORMIS SYNDROME: Status: ACTIVE | Noted: 2023-04-20

## 2023-04-20 PROBLEM — E66.9 OBESITY: Status: ACTIVE | Noted: 2023-04-20

## 2023-04-20 PROBLEM — G56.00 CARPAL TUNNEL SYNDROME: Status: ACTIVE | Noted: 2023-04-20

## 2023-04-20 PROBLEM — F41.9 ANXIETY: Status: ACTIVE | Noted: 2023-04-20

## 2023-04-20 PROBLEM — F31.9 BIPOLAR DISORDER: Status: ACTIVE | Noted: 2023-04-20

## 2023-04-20 PROCEDURE — 4010F ACE/ARB THERAPY RXD/TAKEN: CPT | Mod: CPTII,,, | Performed by: NURSE PRACTITIONER

## 2023-04-20 PROCEDURE — 3074F SYST BP LT 130 MM HG: CPT | Mod: CPTII,,, | Performed by: NURSE PRACTITIONER

## 2023-04-20 PROCEDURE — 1160F PR REVIEW ALL MEDS BY PRESCRIBER/CLIN PHARMACIST DOCUMENTED: ICD-10-PCS | Mod: CPTII,,, | Performed by: NURSE PRACTITIONER

## 2023-04-20 PROCEDURE — 3008F PR BODY MASS INDEX (BMI) DOCUMENTED: ICD-10-PCS | Mod: CPTII,,, | Performed by: NURSE PRACTITIONER

## 2023-04-20 PROCEDURE — 1159F PR MEDICATION LIST DOCUMENTED IN MEDICAL RECORD: ICD-10-PCS | Mod: CPTII,,, | Performed by: NURSE PRACTITIONER

## 2023-04-20 PROCEDURE — 99214 PR OFFICE/OUTPT VISIT, EST, LEVL IV, 30-39 MIN: ICD-10-PCS | Mod: ,,, | Performed by: NURSE PRACTITIONER

## 2023-04-20 PROCEDURE — 1159F MED LIST DOCD IN RCRD: CPT | Mod: CPTII,,, | Performed by: NURSE PRACTITIONER

## 2023-04-20 PROCEDURE — 3078F PR MOST RECENT DIASTOLIC BLOOD PRESSURE < 80 MM HG: ICD-10-PCS | Mod: CPTII,,, | Performed by: NURSE PRACTITIONER

## 2023-04-20 PROCEDURE — 99214 OFFICE O/P EST MOD 30 MIN: CPT | Mod: ,,, | Performed by: NURSE PRACTITIONER

## 2023-04-20 PROCEDURE — 3008F BODY MASS INDEX DOCD: CPT | Mod: CPTII,,, | Performed by: NURSE PRACTITIONER

## 2023-04-20 PROCEDURE — 4010F PR ACE/ARB THEARPY RXD/TAKEN: ICD-10-PCS | Mod: CPTII,,, | Performed by: NURSE PRACTITIONER

## 2023-04-20 PROCEDURE — 3074F PR MOST RECENT SYSTOLIC BLOOD PRESSURE < 130 MM HG: ICD-10-PCS | Mod: CPTII,,, | Performed by: NURSE PRACTITIONER

## 2023-04-20 PROCEDURE — 1160F RVW MEDS BY RX/DR IN RCRD: CPT | Mod: CPTII,,, | Performed by: NURSE PRACTITIONER

## 2023-04-20 PROCEDURE — 3078F DIAST BP <80 MM HG: CPT | Mod: CPTII,,, | Performed by: NURSE PRACTITIONER

## 2023-04-20 RX ORDER — OLANZAPINE AND SAMIDORPHAN L-MALATE 10; 10 MG/1; MG/1
1 TABLET, FILM COATED ORAL DAILY
COMMUNITY
Start: 2023-04-03

## 2023-04-20 RX ORDER — ROPINIROLE 3 MG/1
1 TABLET, FILM COATED ORAL DAILY
COMMUNITY
Start: 2023-03-22

## 2023-04-20 RX ORDER — VILAZODONE HYDROCHLORIDE 20 MG/1
1 TABLET ORAL DAILY
COMMUNITY
Start: 2023-04-03

## 2023-04-20 RX ORDER — ATORVASTATIN CALCIUM 40 MG/1
1 TABLET, FILM COATED ORAL DAILY
COMMUNITY
Start: 2023-03-22

## 2023-04-20 NOTE — ASSESSMENT & PLAN NOTE
Patient educated on importance of diet and exercise.  Weight loss goals discussed. Recommended 30-45 minutes of exercise five days a week.  In addition, counseled patient on importance of low fat diet, limiting carbohydrate intake, and increasing protein and vegetable intake. Hand outs provided. All questions answered. Contacted patient's psychiatrist. Ok to begin contrave. must monitor mood, patient states understanding

## 2023-04-20 NOTE — PROGRESS NOTES
Patient ID: Sera Osuna  : 1970     Chief Complaint: Weight Loss    Allergies: Patient has No Known Allergies.     History of Present Illness:  The patient is a 52 y.o. White female who presents to clinic for evaluation and management with a chief complaint of Weight Loss   Answer yes or no (+ is 3 or more)  1.   yes. I have been told that I snore.  2.   yes. I have been told that I stop breathing or hold my breath when I sleep although I may have no recollection of this, witnessed apnea.    3.   yes. I am always sleepy during the day even when I slept through the night.  4.   yes. I have high blood pressure.    5.   no. I have type 2 diabetes.    6.   yes. I have been told that asleep restlessly, tossing and turning.  Seven I wake up frequently to use the bathroom.  7.   no. I wake up frequently to use the restroom.  8.   no. I frequently awakens with headaches in the morning.  9.   yes. I tend to fall asleep during inappropriate times.    10. yes. Others have noticed a change in personality, often grumpy or irritable.    11. yes. I am overweight or have recently gained weight.    12. no. I suddenly wake up gasping for breath at times.      Score 8    [place] Sleepiness Scale  Sitting and reading: Moderate chance of dozing  Watching TV: High chance of dozing  Sitting, inactive in a public place (e.g. a theatre or a meeting): Moderate chance of dozing  As a passenger in a car for an hour without a break: Moderate chance of dozing  Lying down to rest in the afternoon when circumstances permit: High chance of dozing  Sitting and talking to someone: Slight chance of dozing  Sitting quietly after a lunch without alcohol: Slight chance of dozing  In a car, while stopped for a few minutes in traffic: Would never doze  Total score: 14      Patient is concerned about gaining weight despite changes in psych medications. Patient admits that she is not counting carbs or calories. She has just given up sodas,  eating smaller portions and avoiding snacks.             Past Medical History:  has a past medical history of Anxiety, Depression, Heart murmur, Hypercholesteremia, Hypertension, and Restless leg syndrome.    Social History:  reports that she has been smoking cigarettes. She has a 7.50 pack-year smoking history. She has never used smokeless tobacco. She reports current alcohol use. She reports that she does not use drugs.    Care Team: Patient Care Team:  KINGSTON Morales as PCP - General (Family Medicine)  Eliseo Jacobo MD as Consulting Physician (General Surgery)  Chucho Shahid OD (Optometry)  Josefa Viveros NP as Nurse Practitioner (Obstetrics and Gynecology)  Roxanna Blank NP (Nurse Practitioner)     Current Medications:  Current Outpatient Medications   Medication Instructions    atorvastatin (LIPITOR) 40 MG tablet 1 tablet, Oral, Daily    fluticasone propionate (FLONASE) 50 mcg/actuation nasal spray No dose, route, or frequency recorded.    LYBALVI 10-10 mg Tab 1 tablet, Oral, Daily    olmesartan (BENICAR) 40 mg, Oral    ondansetron (ZOFRAN) 8 MG tablet No dose, route, or frequency recorded.    propranolol (INDERAL) 10 MG tablet No dose, route, or frequency recorded.    rOPINIRole (REQUIP) 3 MG tablet 1 tablet, Oral, Daily    VIIBRYD 20 mg Tab 1 tablet, Oral, Daily       Review of Systems   Constitutional:  Positive for fatigue. Negative for appetite change, fever and unexpected weight change.   HENT:  Negative for nasal congestion, ear pain, facial swelling, hearing loss, mouth sores, nosebleeds, sore throat and trouble swallowing.    Eyes:  Negative for pain, discharge, redness and visual disturbance.   Respiratory:  Negative for cough, chest tightness and shortness of breath.    Cardiovascular:  Negative for chest pain, palpitations and leg swelling.   Gastrointestinal:  Negative for abdominal pain, blood in stool, constipation, diarrhea and nausea.   Endocrine: Negative for cold  "intolerance, heat intolerance, polydipsia, polyphagia and polyuria.   Genitourinary:  Negative for difficulty urinating, dysuria, frequency and hematuria.   Musculoskeletal:  Negative for arthralgias, joint swelling and joint deformity.   Integumentary:  Negative for color change, rash and mole/lesion.   Neurological:  Negative for dizziness, weakness, headaches and memory loss.   Hematological:  Negative for adenopathy. Does not bruise/bleed easily.   Psychiatric/Behavioral:  Positive for sleep disturbance. Negative for confusion and suicidal ideas.       Visit Vitals  /70 (BP Location: Right arm, Patient Position: Sitting)   Pulse 73   Temp 97.9 °F (36.6 °C)   Ht 5' 2.01" (1.575 m)   Wt 85.9 kg (189 lb 6.4 oz)   SpO2 98%   BMI 34.63 kg/m²       Physical Exam  Constitutional:       Appearance: Normal appearance. She is obese.   HENT:      Head: Normocephalic and atraumatic.      Nose: Nose normal. No congestion.      Mouth/Throat:      Mouth: Mucous membranes are moist.      Pharynx: Oropharynx is clear. No oropharyngeal exudate or posterior oropharyngeal erythema.   Eyes:      Conjunctiva/sclera: Conjunctivae normal.   Cardiovascular:      Rate and Rhythm: Normal rate and regular rhythm.      Pulses: Normal pulses.      Heart sounds: No murmur heard.  Pulmonary:      Effort: Pulmonary effort is normal.      Breath sounds: Normal breath sounds.   Musculoskeletal:         General: No swelling or tenderness. Normal range of motion.      Cervical back: Normal range of motion and neck supple.   Lymphadenopathy:      Cervical: No cervical adenopathy.   Skin:     General: Skin is warm and dry.   Neurological:      General: No focal deficit present.      Mental Status: She is alert and oriented to person, place, and time.   Psychiatric:         Mood and Affect: Mood normal.         Judgment: Judgment normal.        Labs Reviewed:  Chemistry:  Lab Results   Component Value Date     04/14/2023    K 4.2 " 04/14/2023    CHLORIDE 107 04/14/2023    BUN 11.0 04/14/2023    CREATININE 0.88 04/14/2023    EGFRNORACEVR 79 04/14/2023    GLUCOSE 112 04/14/2023    CALCIUM 9.4 04/14/2023    ALKPHOS 137 04/14/2023    LABPROT 7.2 04/14/2023    ALBUMIN 4.3 04/14/2023    BILIDIR 0.2 09/15/2021    IBILI 0.20 09/15/2021    AST 32 04/14/2023    ALT 32 04/14/2023    TSH 1.76 07/23/2021        Lab Results   Component Value Date    HGBA1C 5.5 04/14/2023        Hematology:  Lab Results   Component Value Date    WBC 7.2 04/14/2023    RBC 4.57 04/14/2023    HGB 13.6 04/14/2023    HCT 41.5 04/14/2023    MCV 90.8 04/14/2023    MCH 29.8 04/14/2023    MCHC 32.8 04/14/2023    RDW 12.9 04/14/2023     04/14/2023    MPV 11.6 04/14/2023       Lipid Panel:  Lab Results   Component Value Date    CHOL 137 04/14/2023    HDL 45 04/14/2023    DLDL 65.7 04/14/2023    TRIG 121 04/14/2023        Assessment & Plan:  1. Class 1 obesity due to excess calories with serious comorbidity and body mass index (BMI) of 34.0 to 34.9 in adult  Assessment & Plan:  Patient educated on importance of diet and exercise.  Weight loss goals discussed. Recommended 30-45 minutes of exercise five days a week.  In addition, counseled patient on importance of low fat diet, limiting carbohydrate intake, and increasing protein and vegetable intake. Hand outs provided. All questions answered. Contacted patient's psychiatrist. Ok to begin contrave. must monitor mood, patient states understanding      Orders:  -     CBC Auto Differential; Future; Expected date: 08/20/2023  -     Comprehensive Metabolic Panel; Future; Expected date: 08/20/2023  -     Lipid Panel; Future; Expected date: 08/20/2023  -     TSH; Future; Expected date: 08/20/2023  -     Hemoglobin A1C; Future; Expected date: 08/20/2023    2. Primary hypertension  Overview:  No longer following up with [facility].  Taking lisinopril    Orders:  -     CBC Auto Differential; Future; Expected date: 08/20/2023  -     Comprehensive  Metabolic Panel; Future; Expected date: 08/20/2023  -     Lipid Panel; Future; Expected date: 08/20/2023  -     TSH; Future; Expected date: 08/20/2023  -     Hemoglobin A1C; Future; Expected date: 08/20/2023    3. Mixed hyperlipidemia  Overview:  Taking atorvastatin 20    Assessment & Plan:  Lipid Panel:  Lab Results   Component Value Date    CHOL 137 04/14/2023    HDL 45 04/14/2023    DLDL 65.7 04/14/2023    TRIG 121 04/14/2023    AST 32 04/14/2023    ALT 32 04/14/2023    ALKPHOS 137 04/14/2023    LABPROT 7.2 04/14/2023    ALBUMIN 4.3 04/14/2023    BILIDIR 0.2 09/15/2021    IBILI 0.20 09/15/2021          Orders:  -     CBC Auto Differential; Future; Expected date: 08/20/2023  -     Comprehensive Metabolic Panel; Future; Expected date: 08/20/2023  -     Lipid Panel; Future; Expected date: 08/20/2023  -     TSH; Future; Expected date: 08/20/2023  -     Hemoglobin A1C; Future; Expected date: 08/20/2023    4. Prediabetes  Overview:  10/23/2019 A1c 5.7, lifestyle modifications    Assessment & Plan:  Diabetes labs:   Lab Results   Component Value Date    HGBA1C 5.5 04/14/2023          Orders:  -     CBC Auto Differential; Future; Expected date: 08/20/2023  -     Comprehensive Metabolic Panel; Future; Expected date: 08/20/2023  -     Lipid Panel; Future; Expected date: 08/20/2023  -     TSH; Future; Expected date: 08/20/2023  -     Hemoglobin A1C; Future; Expected date: 08/20/2023    5. Snores  Comments:  consult for HOME sleep study  Orders:  -     Ambulatory referral/consult to Sleep Disorders; Future; Expected date: 04/27/2023    6. Witnessed apneic spells  Comments:  consult for HOME sleep study  Orders:  -     Ambulatory referral/consult to Sleep Disorders; Future; Expected date: 04/27/2023    7. Has daytime drowsiness  Comments:  consult for HOME sleep study  Orders:  -     Ambulatory referral/consult to Sleep Disorders; Future; Expected date: 04/27/2023    8. Hypersomnia  Comments:  consult for HOME sleep  study  Other orders  -     naltrexone-bupropion (CONTRAVE) 8-90 mg TbSR; Week 1: 1 tablet PO daily Week 2: 1 tablet PO BID Week 3: 1 tab PO in AM and 2 tabs PO at HS Week 4 and on: 2 tabs PO BID.  Dispense: 120 tablet; Refill: 5         Future Appointments   Date Time Provider Department Center   8/17/2023  8:20 AM LAB, [facility] LABORATORY DRAW STATION [facility]DS [facility]   8/23/2023  2:00 PM [facility]   2/28/2024 11:00 AM Josefa Viveros NP [facility]     Lab Frequency Next Occurrence   Ambulatory referral/consult to Sleep Disorders Once 04/27/2023   CBC Auto Differential Once 08/20/2023   Comprehensive Metabolic Panel Once 08/20/2023   Lipid Panel Once 08/20/2023   TSH Once 08/20/2023   Hemoglobin A1C Once 08/20/2023       No follow-ups on file. Call sooner if needed.    KINGSTON MORALES

## 2023-04-20 NOTE — ASSESSMENT & PLAN NOTE
Lipid Panel:  Lab Results   Component Value Date    CHOL 137 04/14/2023    HDL 45 04/14/2023    DLDL 65.7 04/14/2023    TRIG 121 04/14/2023    AST 32 04/14/2023    ALT 32 04/14/2023    ALKPHOS 137 04/14/2023    LABPROT 7.2 04/14/2023    ALBUMIN 4.3 04/14/2023    BILIDIR 0.2 09/15/2021    IBILI 0.20 09/15/2021

## 2023-05-16 ENCOUNTER — OFFICE VISIT (OUTPATIENT)
Dept: FAMILY MEDICINE | Facility: CLINIC | Age: 53
End: 2023-05-16
Payer: MEDICAID

## 2023-05-16 VITALS
WEIGHT: 185.44 LBS | BODY MASS INDEX: 34.12 KG/M2 | HEIGHT: 62 IN | SYSTOLIC BLOOD PRESSURE: 130 MMHG | OXYGEN SATURATION: 96 % | DIASTOLIC BLOOD PRESSURE: 90 MMHG | TEMPERATURE: 98 F | HEART RATE: 78 BPM

## 2023-05-16 DIAGNOSIS — M54.50 ACUTE BILATERAL LOW BACK PAIN WITHOUT SCIATICA: Primary | ICD-10-CM

## 2023-05-16 PROCEDURE — 1160F PR REVIEW ALL MEDS BY PRESCRIBER/CLIN PHARMACIST DOCUMENTED: ICD-10-PCS | Mod: CPTII,,, | Performed by: NURSE PRACTITIONER

## 2023-05-16 PROCEDURE — 3075F PR MOST RECENT SYSTOLIC BLOOD PRESS GE 130-139MM HG: ICD-10-PCS | Mod: CPTII,,, | Performed by: NURSE PRACTITIONER

## 2023-05-16 PROCEDURE — 99214 PR OFFICE/OUTPT VISIT, EST, LEVL IV, 30-39 MIN: ICD-10-PCS | Mod: 25,,, | Performed by: NURSE PRACTITIONER

## 2023-05-16 PROCEDURE — 3080F DIAST BP >= 90 MM HG: CPT | Mod: CPTII,,, | Performed by: NURSE PRACTITIONER

## 2023-05-16 PROCEDURE — 3080F PR MOST RECENT DIASTOLIC BLOOD PRESSURE >= 90 MM HG: ICD-10-PCS | Mod: CPTII,,, | Performed by: NURSE PRACTITIONER

## 2023-05-16 PROCEDURE — 1159F MED LIST DOCD IN RCRD: CPT | Mod: CPTII,,, | Performed by: NURSE PRACTITIONER

## 2023-05-16 PROCEDURE — 3008F PR BODY MASS INDEX (BMI) DOCUMENTED: ICD-10-PCS | Mod: CPTII,,, | Performed by: NURSE PRACTITIONER

## 2023-05-16 PROCEDURE — 3075F SYST BP GE 130 - 139MM HG: CPT | Mod: CPTII,,, | Performed by: NURSE PRACTITIONER

## 2023-05-16 PROCEDURE — 1159F PR MEDICATION LIST DOCUMENTED IN MEDICAL RECORD: ICD-10-PCS | Mod: CPTII,,, | Performed by: NURSE PRACTITIONER

## 2023-05-16 PROCEDURE — 99214 OFFICE O/P EST MOD 30 MIN: CPT | Mod: 25,,, | Performed by: NURSE PRACTITIONER

## 2023-05-16 PROCEDURE — 1160F RVW MEDS BY RX/DR IN RCRD: CPT | Mod: CPTII,,, | Performed by: NURSE PRACTITIONER

## 2023-05-16 PROCEDURE — 4010F ACE/ARB THERAPY RXD/TAKEN: CPT | Mod: CPTII,,, | Performed by: NURSE PRACTITIONER

## 2023-05-16 PROCEDURE — 3008F BODY MASS INDEX DOCD: CPT | Mod: CPTII,,, | Performed by: NURSE PRACTITIONER

## 2023-05-16 PROCEDURE — 4010F PR ACE/ARB THEARPY RXD/TAKEN: ICD-10-PCS | Mod: CPTII,,, | Performed by: NURSE PRACTITIONER

## 2023-05-16 RX ORDER — NAPROXEN 500 MG/1
500 TABLET ORAL 2 TIMES DAILY
Qty: 60 TABLET | Refills: 0 | Status: SHIPPED | OUTPATIENT
Start: 2023-05-16

## 2023-05-16 RX ORDER — TIZANIDINE 4 MG/1
4 TABLET ORAL EVERY 8 HOURS PRN
Qty: 20 TABLET | Refills: 0 | Status: SHIPPED | OUTPATIENT
Start: 2023-05-16 | End: 2023-05-26

## 2023-05-16 RX ORDER — KETOROLAC TROMETHAMINE 30 MG/ML
60 INJECTION, SOLUTION INTRAMUSCULAR; INTRAVENOUS
Status: COMPLETED | OUTPATIENT
Start: 2023-05-16 | End: 2023-05-16

## 2023-05-16 RX ADMIN — KETOROLAC TROMETHAMINE 60 MG: 30 INJECTION, SOLUTION INTRAMUSCULAR; INTRAVENOUS at 04:05

## 2023-05-16 NOTE — ASSESSMENT & PLAN NOTE
Obtain updated x-ray, Toradol given in office, begin NSAID. Educated on need to avoid taking more than one NSAID at a time. May take 2 extra strength Tylenol TID as needed for pain. Educated that muscle relaxants can cause drowsiness. Patient states understanding. Will notify of xray results when available. Handouts for stretching exercises provided.  If no improvement we will consult PT. discussed red flag symptoms for which to seek ER treatment or notify office immediately.

## 2023-05-16 NOTE — PROGRESS NOTES
Patient ID: Sera Osuna  : 1970     Chief Complaint: Back Pain (Radiates/catherine flank pain)    Allergies: Patient has No Known Allergies.     History of Present Illness:  The patient is a 52 y.o. White female who presents to clinic for evaluation and management with a chief complaint of Back Pain (Radiates/catherine flank pain)   Had similar back pain a few years ago, improved with conservative therapy. Pain restarted about a week ago, denies falls or injuries.     Back Pain  This is a recurrent problem. The current episode started in the past 7 days. The problem occurs constantly. The problem is unchanged. The pain is present in the lumbar spine and sacro-iliac. The quality of the pain is described as aching. The pain does not radiate. The pain is at a severity of 9/10. The pain is severe. The pain is The same all the time. The symptoms are aggravated by lying down and sitting. Pertinent negatives include no abdominal pain, bladder incontinence, bowel incontinence, chest pain, dysuria, fever, headaches, leg pain, numbness, paresis, paresthesias, pelvic pain, perianal numbness, tingling, weakness or weight loss. She has tried heat and walking for the symptoms. The treatment provided mild relief.      Past Medical History:  has a past medical history of Anxiety, Depression, Heart murmur, Hypercholesteremia, Hypertension, and Restless leg syndrome.    Social History:  reports that she has been smoking cigarettes. She has a 7.50 pack-year smoking history. She has been exposed to tobacco smoke. She has never used smokeless tobacco. She reports current alcohol use. She reports that she does not use drugs.    Care Team: Patient Care Team:  KINGSTON Morales as PCP - General (Family Medicine)  Eliseo Jacobo MD as Consulting Physician (General Surgery)  Chucho Shahid OD (Optometry)  Josefa Viveros NP as Nurse Practitioner (Obstetrics and Gynecology)  Roxanna Blank NP (Nurse Practitioner)  "    Current Medications:  Current Outpatient Medications   Medication Instructions    atorvastatin (LIPITOR) 40 MG tablet 1 tablet, Oral, Daily    fluticasone propionate (FLONASE) 50 mcg/actuation nasal spray No dose, route, or frequency recorded.    LYBALVI 10-10 mg Tab 1 tablet, Oral, Daily    naltrexone-bupropion (CONTRAVE) 8-90 mg TbSR Week 1: 1 tablet PO daily Week 2: 1 tablet PO BID Week 3: 1 tab PO in AM and 2 tabs PO at HS Week 4 and on: 2 tabs PO BID.    olmesartan (BENICAR) 40 mg, Oral    ondansetron (ZOFRAN) 8 MG tablet No dose, route, or frequency recorded.    propranolol (INDERAL) 10 MG tablet No dose, route, or frequency recorded.    rOPINIRole (REQUIP) 3 MG tablet 1 tablet, Oral, Daily    VIIBRYD 20 mg Tab 1 tablet, Oral, Daily       Review of Systems   Constitutional:  Negative for fever and weight loss.   Cardiovascular:  Negative for chest pain.   Gastrointestinal:  Negative for abdominal pain and bowel incontinence.   Genitourinary:  Negative for bladder incontinence, dysuria and pelvic pain.   Musculoskeletal:  Positive for back pain. Negative for leg pain.   Neurological:  Negative for tingling, weakness, numbness, headaches and paresthesias.      Visit Vitals  BP (!) 130/90 (BP Location: Right arm, Patient Position: Sitting)   Pulse 78   Temp 98.4 °F (36.9 °C) (Temporal)   Ht 5' 2.01" (1.575 m)   Wt 84.1 kg (185 lb 6.5 oz)   SpO2 96%   BMI 33.90 kg/m²       Physical Exam  Vitals reviewed.   Constitutional:       General: She is not in acute distress.     Appearance: Normal appearance.   HENT:      Head: Normocephalic and atraumatic.      Nose: Nose normal.      Mouth/Throat:      Mouth: Mucous membranes are moist.      Pharynx: Oropharynx is clear.   Eyes:      Conjunctiva/sclera: Conjunctivae normal.   Cardiovascular:      Rate and Rhythm: Normal rate and regular rhythm.      Pulses: Normal pulses.      Heart sounds: No murmur heard.  Pulmonary:      Effort: Pulmonary effort is normal.      " Breath sounds: Normal breath sounds.   Abdominal:      Tenderness: There is no right CVA tenderness or left CVA tenderness.   Musculoskeletal:         General: Tenderness present. No swelling or deformity.      Lumbar back: Spasms, tenderness and bony tenderness present. No swelling, edema, deformity, signs of trauma or lacerations. Normal range of motion. Negative right straight leg raise test and negative left straight leg raise test.      Right lower leg: No edema.      Left lower leg: No edema.   Skin:     General: Skin is warm and dry.      Coloration: Skin is not jaundiced.   Neurological:      Mental Status: She is alert and oriented to person, place, and time.   Psychiatric:         Mood and Affect: Mood normal.        Assessment & Plan:  1. Acute bilateral low back pain without sciatica  Overview:  3/26/21 lumbar x-ray mild-to-moderate deg changes to right SI and hip joints.     Assessment & Plan:  Obtain updated x-ray, Toradol given in office, begin NSAID. Educated on need to avoid taking more than one NSAID at a time. May take 2 extra strength Tylenol TID as needed for pain. Educated that muscle relaxants can cause drowsiness. Patient states understanding. Will notify of xray results when available. Handouts for stretching exercises provided.  If no improvement we will consult PT. discussed red flag symptoms for which to seek ER treatment or notify office immediately.      Orders:  -     X-Ray Lumbar Spine 2 Or 3 Views  -     naproxen (NAPROSYN) 500 MG tablet; Take 1 tablet (500 mg total) by mouth 2 (two) times daily.  Dispense: 60 tablet; Refill: 0  -     tiZANidine (ZANAFLEX) 4 MG tablet; Take 1 tablet (4 mg total) by mouth every 8 (eight) hours as needed (muscle pain). May cause drowsiness  Dispense: 20 tablet; Refill: 0  -     ketorolac injection 60 mg         Future Appointments   Date Time Provider Department Center   8/17/2023  8:20 AM LAB, [facility] LABORATORY DRAW STATION [facility] CRISSY Pearson    8/23/2023  2[facility]00 [facility]   2/28/2024 11:00 AM Josefa Viveros NP [facility]       Follow up if symptoms worsen or fail to improve, for Keep Apt as Scheduled. Call sooner if needed.    KINGSTON MORALES    Lab Frequency Next Occurrence   Ambulatory referral/consult to Sleep Disorders Once 04/27/2023   CBC Auto Differential Once 08/20/2023   Comprehensive Metabolic Panel Once 08/20/2023   Lipid Panel Once 08/20/2023   TSH Once 08/20/2023   Hemoglobin A1C Once 08/20/2023

## 2023-05-17 ENCOUNTER — PATIENT MESSAGE (OUTPATIENT)
Dept: FAMILY MEDICINE | Facility: CLINIC | Age: 53
End: 2023-05-17
Payer: MEDICAID

## 2023-05-18 ENCOUNTER — TELEPHONE (OUTPATIENT)
Dept: FAMILY MEDICINE | Facility: CLINIC | Age: 53
End: 2023-05-18
Payer: MEDICAID

## 2023-05-18 NOTE — TELEPHONE ENCOUNTER
----- Message from KINGSTON Morales sent at 5/17/2023  9:40 AM CDT -----  Please notify patient of following results:     Xray shows same arthritic changes in spine as previous imaging. If no improvement with home exercises can consult PT.   xray also shows moderate constipation. Would like patient to increase water (minimum of 64oz/day) and fiber in diet. Does she need medication to help such as miralax?

## 2024-04-18 NOTE — PROGRESS NOTES
Chief Complaint: Annual exam    Chief Complaint   Patient presents with    Well Woman       HPI:   53 y.o. F  presents for an annual gyn exam. S/p KAREN ovary sparing, denies vaginal bleeding. No current use of HRT.       FmHx: negative for breast, uterine, ovarian, and colon cancers.     Labs / Significant Studies:  MENOPAUSAL:  Age at menarche: 12  Age at menopause: KAREN ovary sparing   Hysterectomy: Yes  Last pap: S/P Hyst   H/o abnl pap: No  Colposcopy: No  Postcoital bleeding: No  Sexually active: Not currently   Dyspareunia: No  H/o STI: No   HRT: No  MMG: 3/3/23 Benign   H/o abnl MMG: Yes (scar tissue)   C    Family History   Problem Relation Name Age of Onset    No Known Problems Paternal Grandfather      No Known Problems Paternal Grandmother      No Known Problems Maternal Grandmother      No Known Problems Maternal Grandfather      Heart disease Father      Hypertension Father      Hyperlipidemia Father      No Known Problems Mother      Breast cancer Neg Hx      Colon cancer Neg Hx      Ovarian cancer Neg Hx      Uterine cancer Neg Hx      Cervical cancer Neg Hx           Past Medical History:   Diagnosis Date    Anxiety     Depression     Heart murmur     Hypercholesteremia     Hypertension     Restless leg syndrome      Past Surgical History:   Procedure Laterality Date    APPENDECTOMY      BREAST LIFT       SECTION      ENDOMETRIAL ABLATION      SHOULDER SURGERY Right     Rotator Cuff Repair    TOTAL ABDOMINAL HYSTERECTOMY      with ovary sparing       Current Outpatient Medications:     atorvastatin (LIPITOR) 40 MG tablet, Take 1 tablet by mouth Daily., Disp: , Rfl:     fluticasone propionate (FLONASE) 50 mcg/actuation nasal spray, , Disp: , Rfl:     olmesartan (BENICAR) 40 MG tablet, Take 40 mg by mouth., Disp: , Rfl:     omeprazole (PRILOSEC) 40 MG capsule, Take 40 mg by mouth every morning., Disp: , Rfl:     ondansetron (ZOFRAN) 8 MG tablet, , Disp: , Rfl:     propranolol (INDERAL) 10  MG tablet, , Disp: , Rfl:     QUEtiapine (SEROQUEL) 300 MG Tab, Take 300 mg by mouth every evening., Disp: , Rfl:     QUVIVIQ 50 mg Tab, Take 1 tablet by mouth once daily., Disp: , Rfl:     rOPINIRole (REQUIP) 3 MG tablet, Take 1 tablet by mouth Daily., Disp: , Rfl:     rosuvastatin (CRESTOR) 10 MG tablet, Take 10 mg by mouth every evening., Disp: , Rfl:     topiramate (TOPAMAX) 25 MG tablet, Take 25 mg by mouth 2 (two) times daily., Disp: , Rfl:     VIIBRYD 20 mg Tab, Take 1 tablet by mouth Daily., Disp: , Rfl:     Review of patient's allergies indicates:  No Known Allergies    Social History     Tobacco Use    Smoking status: Every Day     Current packs/day: 0.50     Average packs/day: 0.5 packs/day for 15.0 years (7.5 ttl pk-yrs)     Types: Cigarettes     Passive exposure: Current    Smokeless tobacco: Never   Substance Use Topics    Alcohol use: Not Currently    Drug use: Never       Review of Systems:  General/Constitutional: Chills denies. Fatigue/weakness denies. Fever denies. Night sweats denies. Hot flashes denies    Respiratory: Cough denies. Hemoptysis denies. SOB denies. Sputum production denies. Wheezing denies .   Cardiovascular: Chest pain denies . Dizziness denies. Palpitations denies. Swelling in hands/feet denies    Gastrointestinal: Abdominal pain denies. Blood in stool denies. Constipation denies. Diarrhea denies. Heartburn denies. Nausea denies. Vomiting denies    Genitourinary: Incontinence denies. Blood in urine denies. Frequent urination denies. Painful urination denies. Urinary urgency denies. Nocturia denies    Gynecologic: Irregular menses denies. Heavy bleeding denies. Painful menses denies. Vaginal discharge denies. Vaginal odor denies. Vaginal itching denies. Vaginal lesion denies. Pelvic pain denies. Decreased libido denies. Vulvar lesion denies. Prolapse of genital organs denies. Painful intercourse denies. Postcoital bleeding denies    Psychiatric: Depression denies. Anxiety denies  "    Physical Exam:   Vitals:    05/09/24 1052   BP: 118/74   Temp: 97.2 °F (36.2 °C)   Weight: 84.5 kg (186 lb 3.2 oz)   Height: 5' 2.01" (1.575 m)       Body mass index is 34.05 kg/m².       Chaperone: present.     General appearance: healthy, well-nourished and well-developed     Psychiatric: Orientation to time, place and person. Normal mood and affect and active, alert     Skin: Appearance: no rashes or lesions.     Neck:   Neck: supple, FROM, trachea midline. and no masses   Thyroid: no enlargement or nodules and non-tender.       Cardiovascular:   Auscultation: RRR and no murmur.   Peripheral Vascular: no varicosities, LLE edema, RLE edema, calf tenderness, and palpable cord and pedal pulses intact.     Lungs:   Respiratory effort: no intercostal retractions or accessory muscle usage.   Auscultation: no wheezing, rales/crackles, or rhonchi and clear to auscultation.     Breast:   Inspection/Palpation: no tenderness, discrete/distinct masses, skin changes, or abnormal secretions. Nipple appearance normal.     Abdomen:   Auscultation/Inspection/Palpation: no hepatomegaly, splenomegaly, masses, tenderness or CVA tenderness and soft, non-distended bowel sounds preset.    Hernia: no palpable hernias.     Female Genitalia:    Vulva: no masses, tenderness or lesions    Bladder/Urethra: no urethral discharge or mass, normal meatus, bladder non-distended.    Vagina: no tenderness, erythema, cystocele, rectocele, abnormal vaginal discharge or vesicle(s) or ulcers. Cuff intact. Atrophic   Bimanual: non tender, no masses.      Lymph Nodes:   Palpation: non tender submandibular nodes, axillary nodes, or inguinal nodes.     Rectal Exam:   Rectum: normal perianal skin.       Assessment:     Patient Active Problem List   Diagnosis    Anxiety    Bipolar disorder    Carpal tunnel syndrome    Heart murmur    Hypertension    Lumbar radiculopathy    Mixed hyperlipidemia    Class 1 obesity due to excess calories with serious " comorbidity and body mass index (BMI) of 34.0 to 34.9 in adult    Piriformis syndrome    Prediabetes    Acute bilateral low back pain without sciatica       Health Maintenance Due   Topic Date Due    Hepatitis C Screening  Never done    Pneumococcal Vaccines (Age 0-64) (1 of 2 - PCV) Never done    HIV Screening  Never done    TETANUS VACCINE  Never done    Shingles Vaccine (1 of 2) Never done    COVID-19 Vaccine (1 - 2023-24 season) Never done    Mammogram  03/03/2024    Hemoglobin A1c (Prediabetes)  04/14/2024     Health Maintenance Topics with due status: Not Due       Topic Last Completion Date    Colorectal Cancer Screening 09/17/2021    Lipid Panel 04/14/2023    Influenza Vaccine Not Due         Plan:    Sera was seen today for well woman.    Diagnoses and all orders for this visit:    Screening mammogram for breast cancer  -     Mammo Digital Screening Bilat w/ Cornelius; Future    Abnormal gynecological examination    Reviewed calcium needs, exercise, and prevention of osteoporosis.  Healthy diet  Annual MMG  Discussed breast self-awareness  Colonoscopy q 10 yrs  Reviewed normal menopause and menopausal symptoms  RTC 1 yr   Atrophic vaginitis  - Educated     - Lubricants, coconut oil, baby oil

## 2024-04-29 NOTE — TELEPHONE ENCOUNTER
----- Message -----   Pt verbalized understanding       From: Kelvin Santizo LPN   Sent: 1/23/2023   2:43 PM CST   To: KINGSTON Morales   Subject: GI PCR                                                ----- Message -----   From: KINGSTON Morales   Sent: 1/23/2023   2:29 PM CST   To: Kelvin Santizo LPN   Subject: RE: Labs                                           Please notify patient her stool sample came back positive for E coli as well as vibrio.  The form of E coli she has is the most common cause of diarrhea in children.  Does not require treatment just encourage hydration.  Can use Imodium if needed.  Vibrio is typically caused by eating undercooked shellfish or seafood.  only needs treatment if diarrhea longer than 5 days.  If patient is still symptomatic?  If so we will be sending out doxycycline 100 mg b.i.d. x5 days.    ----- Message -----   From: Kelvin Santizo LPN   Sent: 1/23/2023   2:05 PM CST   To: KINGSTON Morales   Subject: Labs                                                  ----- Message -----   From: Mile Prakash   Sent: 1/23/2023  12:16 PM CST   To: Tamir Myers Staff   Subject: Import                                             The document below was attached by Mile Prakash [[id number]] on 1/23/2023 at 12:16 PM to the following: Sera Osuna [[id number]].      Transitional Care Management Visit       Admission Date: 04/10/2024  Discharge Date: 04/12/2024   This Visit: 04/29/2024 is 17 days after discharge.    Samantha Crouch is a 63 year old here for Office Visit and Transitional Care Management     The discharge summary was reviewed. It documents that the patient was hospitalized for acute pancreatitis.    Medication changes include: no changes.  Pertinent hospital labs and tests: were reviewed and the findings are significant for labs including hypokalemia, elevated lipase,   CT 4/10/24:   \"IMPRESSION:     1. Acute pancreatitis, with peripancreatic inflammatory changes  predominantly involving the pancreatic body and tail. No focal, organized  collection.  2. Hazy stranding along the colon at the splenic flexure, likely reactive  related to the adjacent peripancreatic inflammatory changes.  3. Prostatomegaly with sequela of chronic urinary outlet obstruction.\"  Durable Medical Equipment/Assistive devices ordered: None     He was checked for autoimmune pancreatitis - which was just mildly elevated on this draw - normal in 2020.  Also possibly attributed to lisinopril.   Previous pancreatitis - 1/2020 - due to amlodipine      Review of Systems   All other systems reviewed and are negative.    As documented above.    Advance care planning documents on file - no    Objective   Vitals:    04/29/24 0916   BP: 132/80   Pulse: 84   SpO2: 95%   Weight: 90.6 kg (199 lb 13.6 oz)   Height: 5' 6\" (1.676 m)     Physical Exam  Vitals and nursing note reviewed.   Constitutional:       General: He is not in acute distress.     Appearance: Normal appearance. He is not ill-appearing.   HENT:      Head: Normocephalic and atraumatic.   Cardiovascular:      Rate and Rhythm: Normal rate and regular rhythm.      Pulses: Normal pulses.      Heart sounds: Normal heart sounds. No murmur heard.  Pulmonary:      Effort: Pulmonary effort is normal. No respiratory distress.      Breath sounds:  Normal breath sounds. No stridor.   Abdominal:      General: Abdomen is flat. Bowel sounds are normal. There is no distension.      Palpations: Abdomen is soft. There is no mass.      Comments: Upper abdominal bulge. Mild left upper abdominal pain   Musculoskeletal:      Right lower leg: No edema.      Left lower leg: No edema.   Skin:     General: Skin is warm and dry.      Coloration: Skin is not jaundiced or pale.   Neurological:      General: No focal deficit present.      Mental Status: He is alert and oriented to person, place, and time.      Cranial Nerves: No cranial nerve deficit.      Sensory: No sensory deficit.   Psychiatric:         Mood and Affect: Mood normal.         Behavior: Behavior normal.         Thought Content: Thought content normal.         Judgment: Judgment normal.                ASSESSMENT AND PLAN        1. Pancreatitis, unspecified pancreatitis type  -     Basic Metabolic Panel; Future  -     CBC with Automated Differential; Future  -     Lipase; Future  2. Hypokalemia  -     Basic Metabolic Panel; Future  -     CBC with Automated Differential; Future  3. Enlarged prostate  -     SERVICE TO UROLOGY  4. HTN (hypertension), benign    Repeat labs ordered  Urology consulted for enlarged prostate- not completed in hospital.  Follow up in one month with Dr. Grissom.   Did reach out to GI about concern of lisinopril contributing. They will follow up on this.     Discharge medication were reviewed and updated with patient/family: fully compliant with the medication regimen prescribed at the time of discharge     Adherence to discharge treatment plan was assessed: fully adherent with the entire discharge treatment plan.    Follow Up  Return in about 4 weeks (around 5/27/2024).     Future Appointments          MAY 5    2024   10:30 AM - MRI Thoracic Spine  [facility] - MRI Scan - LSS MRI 1 WB (1.5T)           MAY 29    2024   10:40 AM - Office Visit  [facility]  2nd Fl - Hazel Grissom MD           JUN 20 2024   09:15 AM - New Patient Visit  [facility] - Kristen Abdi MD

## 2024-05-09 ENCOUNTER — OFFICE VISIT (OUTPATIENT)
Dept: OBSTETRICS AND GYNECOLOGY | Facility: CLINIC | Age: 54
End: 2024-05-09
Payer: COMMERCIAL

## 2024-05-09 VITALS
DIASTOLIC BLOOD PRESSURE: 74 MMHG | HEIGHT: 62 IN | BODY MASS INDEX: 34.26 KG/M2 | WEIGHT: 186.19 LBS | TEMPERATURE: 97 F | SYSTOLIC BLOOD PRESSURE: 118 MMHG

## 2024-05-09 DIAGNOSIS — Z12.31 SCREENING MAMMOGRAM FOR BREAST CANCER: Primary | ICD-10-CM

## 2024-05-09 DIAGNOSIS — N95.2 ATROPHIC VAGINITIS: ICD-10-CM

## 2024-05-09 DIAGNOSIS — Z01.411 ABNORMAL GYNECOLOGICAL EXAMINATION: ICD-10-CM

## 2024-05-09 PROCEDURE — 3074F SYST BP LT 130 MM HG: CPT | Mod: CPTII,,, | Performed by: NURSE PRACTITIONER

## 2024-05-09 PROCEDURE — 4010F ACE/ARB THERAPY RXD/TAKEN: CPT | Mod: CPTII,,, | Performed by: NURSE PRACTITIONER

## 2024-05-09 PROCEDURE — 3008F BODY MASS INDEX DOCD: CPT | Mod: CPTII,,, | Performed by: NURSE PRACTITIONER

## 2024-05-09 PROCEDURE — 1159F MED LIST DOCD IN RCRD: CPT | Mod: CPTII,,, | Performed by: NURSE PRACTITIONER

## 2024-05-09 PROCEDURE — 99396 PREV VISIT EST AGE 40-64: CPT | Mod: ,,, | Performed by: NURSE PRACTITIONER

## 2024-05-09 PROCEDURE — 1160F RVW MEDS BY RX/DR IN RCRD: CPT | Mod: CPTII,,, | Performed by: NURSE PRACTITIONER

## 2024-05-09 PROCEDURE — 3078F DIAST BP <80 MM HG: CPT | Mod: CPTII,,, | Performed by: NURSE PRACTITIONER

## 2024-05-09 RX ORDER — ROSUVASTATIN CALCIUM 10 MG/1
10 TABLET, COATED ORAL NIGHTLY
COMMUNITY
Start: 2023-11-21

## 2024-05-09 RX ORDER — QUETIAPINE FUMARATE 300 MG/1
300 TABLET, FILM COATED ORAL NIGHTLY
COMMUNITY
Start: 2024-05-01

## 2024-05-09 RX ORDER — OMEPRAZOLE 40 MG/1
40 CAPSULE, DELAYED RELEASE ORAL EVERY MORNING
COMMUNITY
Start: 2024-04-17

## 2024-05-09 RX ORDER — DARIDOREXANT 50 MG/1
1 TABLET, FILM COATED ORAL DAILY
COMMUNITY
Start: 2024-04-30

## 2024-05-09 RX ORDER — TOPIRAMATE 25 MG/1
25 TABLET ORAL 2 TIMES DAILY
COMMUNITY
Start: 2024-05-01

## 2024-05-31 ENCOUNTER — HOSPITAL ENCOUNTER (OUTPATIENT)
Dept: RADIOLOGY | Facility: HOSPITAL | Age: 54
Discharge: HOME | End: 2024-05-31
Attending: NURSE PRACTITIONER
Payer: COMMERCIAL

## 2024-05-31 DIAGNOSIS — Z12.31 SCREENING MAMMOGRAM FOR BREAST CANCER: ICD-10-CM

## 2024-05-31 PROCEDURE — 77067 SCR MAMMO BI INCL CAD: CPT | Mod: TC

## 2024-05-31 PROCEDURE — 77063 BREAST TOMOSYNTHESIS BI: CPT | Mod: 26,,, | Performed by: STUDENT IN AN ORGANIZED HEALTH CARE EDUCATION/TRAINING PROGRAM

## 2024-05-31 PROCEDURE — 77067 SCR MAMMO BI INCL CAD: CPT | Mod: 26,,, | Performed by: STUDENT IN AN ORGANIZED HEALTH CARE EDUCATION/TRAINING PROGRAM

## 2024-06-21 ENCOUNTER — HOSPITAL ENCOUNTER (OUTPATIENT)
Dept: RADIOLOGY | Facility: HOSPITAL | Age: 54
Discharge: HOME | End: 2024-06-21
Attending: FAMILY MEDICINE
Payer: COMMERCIAL

## 2024-06-24 ENCOUNTER — HOSPITAL ENCOUNTER (OUTPATIENT)
Dept: RADIOLOGY | Facility: HOSPITAL | Age: 54
Discharge: HOME | End: 2024-06-24
Attending: FAMILY MEDICINE
Payer: COMMERCIAL

## 2024-06-24 DIAGNOSIS — M54.50 LOWER BACK PAIN: ICD-10-CM

## 2024-06-24 PROCEDURE — 72110 X-RAY EXAM L-2 SPINE 4/>VWS: CPT | Mod: TC

## 2024-07-16 DIAGNOSIS — R12 HEARTBURN: Primary | ICD-10-CM

## 2024-07-17 ENCOUNTER — ANESTHESIA EVENT (OUTPATIENT)
Dept: SURGERY | Facility: HOSPITAL | Age: 54
End: 2024-07-17
Payer: COMMERCIAL

## 2024-07-17 ENCOUNTER — CLINICAL SUPPORT (OUTPATIENT)
Dept: RESPIRATORY THERAPY | Facility: HOSPITAL | Age: 54
End: 2024-07-17
Attending: INTERNAL MEDICINE
Payer: COMMERCIAL

## 2024-07-17 DIAGNOSIS — R12 HEARTBURN: ICD-10-CM

## 2024-07-17 LAB
OHS QRS DURATION: 84 MS
OHS QTC CALCULATION: 455 MS

## 2024-07-17 PROCEDURE — 93005 ELECTROCARDIOGRAM TRACING: CPT

## 2024-07-17 PROCEDURE — 93010 ELECTROCARDIOGRAM REPORT: CPT | Mod: ,,, | Performed by: INTERNAL MEDICINE

## 2024-07-17 NOTE — ANESTHESIA PREPROCEDURE EVALUATION
07/17/2024  Sera Osuna is a 53 y.o., female.      Pre-op Assessment    I have reviewed the Patient Summary Reports.     I have reviewed the Nursing Notes. I have reviewed the NPO Status.      Review of Systems  Anesthesia Hx:  No problems with previous Anesthesia   Neg history of prior surgery.          Denies Family Hx of Anesthesia complications.    Denies Personal Hx of Anesthesia complications.                    Social:  Smoker       Hematology/Oncology:  Hematology Normal   Oncology Normal                                   EENT/Dental:  EENT/Dental Normal           Cardiovascular:     Hypertension           hyperlipidemia                             Pulmonary:    Asthma                    Renal/:  Renal/ Normal                 Hepatic/GI:     GERD             Musculoskeletal:  Arthritis          Spine Disorders: lumbar            Neurological:    Neuromuscular Disease,       RLS      Chronic Pain Syndrome                         Endocrine:  Endocrine Normal            Dermatological:  Skin Normal    Psych:  Psychiatric History  depression                Physical Exam  General: Cooperative and Alert    Airway:  Mallampati: I   Mouth Opening: Normal  TM Distance: Normal  Tongue: Normal  Neck ROM: Normal ROM    Dental:  Intact        Anesthesia Plan  Type of Anesthesia, risks & benefits discussed:    Anesthesia Type: Gen Natural Airway  Intra-op Monitoring Plan: Standard ASA Monitors  Post Op Pain Control Plan: multimodal analgesia  Induction:  IV  Informed Consent: Informed consent signed with the Patient and all parties understand the risks and agree with anesthesia plan.  All questions answered. Patient consented to blood products? Yes  ASA Score: 2  Day of Surgery Review of History & Physical: I have interviewed and examined the patient. I have reviewed the patient's H&P dated: There are  no significant changes.     Ready For Surgery From Anesthesia Perspective.     .

## 2024-07-18 ENCOUNTER — ANESTHESIA (OUTPATIENT)
Dept: SURGERY | Facility: HOSPITAL | Age: 54
End: 2024-07-18
Payer: COMMERCIAL

## 2024-07-18 ENCOUNTER — HOSPITAL ENCOUNTER (OUTPATIENT)
Facility: HOSPITAL | Age: 54
Discharge: HOME | End: 2024-07-18
Attending: INTERNAL MEDICINE | Admitting: INTERNAL MEDICINE
Payer: COMMERCIAL

## 2024-07-18 VITALS
TEMPERATURE: 98 F | HEIGHT: 62 IN | HEART RATE: 78 BPM | RESPIRATION RATE: 18 BRPM | OXYGEN SATURATION: 95 % | DIASTOLIC BLOOD PRESSURE: 77 MMHG | SYSTOLIC BLOOD PRESSURE: 122 MMHG | WEIGHT: 186.31 LBS | BODY MASS INDEX: 34.29 KG/M2

## 2024-07-18 DIAGNOSIS — R12 HEARTBURN: ICD-10-CM

## 2024-07-18 PROCEDURE — 37000008 HC ANESTHESIA 1ST 15 MINUTES: Performed by: INTERNAL MEDICINE

## 2024-07-18 PROCEDURE — 27201423 OPTIME MED/SURG SUP & DEVICES STERILE SUPPLY: Performed by: INTERNAL MEDICINE

## 2024-07-18 PROCEDURE — 25000003 PHARM REV CODE 250: Performed by: NURSE ANESTHETIST, CERTIFIED REGISTERED

## 2024-07-18 PROCEDURE — 37000009 HC ANESTHESIA EA ADD 15 MINS: Performed by: INTERNAL MEDICINE

## 2024-07-18 PROCEDURE — 63600175 PHARM REV CODE 636 W HCPCS: Performed by: ANESTHESIOLOGY

## 2024-07-18 PROCEDURE — 63600175 PHARM REV CODE 636 W HCPCS: Performed by: NURSE ANESTHETIST, CERTIFIED REGISTERED

## 2024-07-18 PROCEDURE — 43239 EGD BIOPSY SINGLE/MULTIPLE: CPT | Performed by: INTERNAL MEDICINE

## 2024-07-18 RX ORDER — SODIUM CHLORIDE, SODIUM LACTATE, POTASSIUM CHLORIDE, CALCIUM CHLORIDE 600; 310; 30; 20 MG/100ML; MG/100ML; MG/100ML; MG/100ML
INJECTION, SOLUTION INTRAVENOUS CONTINUOUS
Status: DISCONTINUED | OUTPATIENT
Start: 2024-07-18 | End: 2024-07-18 | Stop reason: HOSPADM

## 2024-07-18 RX ORDER — LIDOCAINE HYDROCHLORIDE 10 MG/ML
1 INJECTION, SOLUTION EPIDURAL; INFILTRATION; INTRACAUDAL; PERINEURAL ONCE
Status: DISCONTINUED | OUTPATIENT
Start: 2024-07-18 | End: 2024-07-18 | Stop reason: HOSPADM

## 2024-07-18 RX ORDER — DEXMEDETOMIDINE HYDROCHLORIDE 100 UG/ML
INJECTION, SOLUTION INTRAVENOUS
Status: DISCONTINUED | OUTPATIENT
Start: 2024-07-18 | End: 2024-07-18

## 2024-07-18 RX ORDER — PROPOFOL 10 MG/ML
VIAL (ML) INTRAVENOUS
Status: DISCONTINUED | OUTPATIENT
Start: 2024-07-18 | End: 2024-07-18

## 2024-07-18 RX ORDER — LIDOCAINE HYDROCHLORIDE 20 MG/ML
INJECTION INTRAVENOUS
Status: DISCONTINUED | OUTPATIENT
Start: 2024-07-18 | End: 2024-07-18

## 2024-07-18 RX ADMIN — PROPOFOL 130 MG: 10 INJECTION, EMULSION INTRAVENOUS at 01:07

## 2024-07-18 RX ADMIN — DEXMEDETOMIDINE 10 MCG: 200 INJECTION, SOLUTION INTRAVENOUS at 01:07

## 2024-07-18 RX ADMIN — LIDOCAINE HYDROCHLORIDE 100 MG: 20 INJECTION, SOLUTION INTRAVENOUS at 01:07

## 2024-07-18 RX ADMIN — SODIUM CHLORIDE, POTASSIUM CHLORIDE, SODIUM LACTATE AND CALCIUM CHLORIDE: 600; 310; 30; 20 INJECTION, SOLUTION INTRAVENOUS at 11:07

## 2024-07-18 RX ADMIN — PROPOFOL 20 MG: 10 INJECTION, EMULSION INTRAVENOUS at 01:07

## 2024-07-18 NOTE — OP NOTE
[facility] - Periop Services  Operative Note    Date of Procedure: 7/18/2024     Procedure: Procedure(s) (LRB):  EGD (ESOPHAGOGASTRODUODENOSCOPY) (N/A)  EGD, WITH CLOSED BIOPSY (N/A)   EGD with biopsy    Surgeons and Role:     * Jourdan Vilchis III, MD - Primary    Assisting Surgeon: None    Pre-Operative Diagnosis: Heartburn [R12]  Heartburn    Post-Operative Diagnosis: Post-Op Diagnosis Codes:     * Heartburn [R12]  Mild-to-moderate gastritis  Status post H pylori biopsy gastric antrum    Endoscopic Specimens:  ID Type Source Tests Collected by Time Destination   A : gastric antrum biopsy; biopsy Tissue Antrum SPECIMEN TO PATHOLOGY Jourdan Vilchis III, MD 7/18/2024 1310          Anesthesia: General    Consent:  Patient was consented for the procedure at my office.  The risks and benefits of procedure explained in detail.  They were willing to undergo those risks.    HPI & Operative Findings (including complications, if any):  53-year-old white female complains of dysphagia, abdominal pain dyspepsia and abdominal bloating.  No other alarm features.    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      Patient was brought down into the endoscopy room suite.  Laid in left lateral decubitus position.  Bite block was placed Olympus gastroscope was then lubricated inserted the posterior oropharynx were there were no abnormalities noted.  The scope was then used to insufflate the esophageal column.  It was passed down easily without any signs of stricture into the stomach.    The stomach showed generalized mild-to-moderate gastritis.  Biopsies taken the gastric antrum.  Duodenum was swept 3rd, 2nd, 1st portions were normal.  Sphincter of OD normal.    Greater lesser curvatures with mild gastritis.  Greater lesser curvatures normal.  Cardiac and fundic regions and angularis were normal as well.  No hiatal hernia.      Scope was pulled back GE junction evaluated was pristine.  No other signs of strokes strictures or signs of  blockage.  Esophageal column had good peristalsis perhaps a little much so but again it looked normal.  The scope was removed patient tolerated the procedure well without complications.      Should the patient continued to have dysphagia and trouble swallowing would consider upper GI series to rule out peristaltic issues in which case we put her on calcium channel blocker.  Otherwise reflux maneuvers and control of her diet.  Consider H 2 blocker at nighttime.    Blood Loss (EBL): 0 mL           Implants: * No implants in log *    Specimens:   Specimen (24h ago, onward)       Start     Ordered    07/18/24 1313  Specimen to Pathology  RELEASE UPON ORDERING        References:    Click here for ordering Quick Tip   Question:  Release to patient  Answer:  Immediate    07/18/24 1313                            Condition: Stable for Discharge    Disposition: Home or Self Care        Discharge Note    OUTCOME: Patient tolerated treatment/procedure well without complication and is now ready for discharge.    DISPOSITION: Home or Self Care    FINAL DIAGNOSIS:  <principal problem not specified>    FOLLOWUP: Follow up in clinic in 1-2 weeks to review biopsies    DISCHARGE INSTRUCTIONS:  No discharge procedures on file.     Clinical Reference Documents Added to Patient Instructions         Document    UPPER GI ENDOSCOPY DISCHARGE INSTRUCTIONS (ENGLISH)

## 2024-07-18 NOTE — ANESTHESIA POSTPROCEDURE EVALUATION
Anesthesia Post Evaluation    Patient: Sera Osuna    Procedure(s) Performed: Procedure(s) (LRB):  EGD (ESOPHAGOGASTRODUODENOSCOPY) (N/A)  EGD, WITH CLOSED BIOPSY (N/A)    Final Anesthesia Type: general      Patient location during evaluation: OPS  Patient participation: Yes- Able to Participate  Level of consciousness: awake  Post-procedure vital signs: reviewed and stable  Pain management: adequate  Airway patency: patent  HARRIETT mitigation strategies: Multimodal analgesia  PONV status at discharge: No PONV  Anesthetic complications: no      Cardiovascular status: hemodynamically stable  Respiratory status: unassisted, room air and spontaneous ventilation  Hydration status: euvolemic  Follow-up not needed.              Vitals Taken Time   BP 07/18/24 1316   Temp 07/18/24 1316   Pulse 07/18/24 1316   Resp 07/18/24 1316   SpO2 07/18/24 1316         No case tracking events are documented in the log.      Pain/Flakito Score: No data recorded

## 2024-07-22 LAB — PSYCHE PATHOLOGY RESULT: NORMAL

## 2024-10-28 ENCOUNTER — LAB VISIT (OUTPATIENT)
Dept: LAB | Facility: HOSPITAL | Age: 54
End: 2024-10-28
Attending: INTERNAL MEDICINE
Payer: COMMERCIAL

## 2024-10-28 ENCOUNTER — CLINICAL SUPPORT (OUTPATIENT)
Dept: RESPIRATORY THERAPY | Facility: HOSPITAL | Age: 54
End: 2024-10-28
Attending: INTERNAL MEDICINE
Payer: COMMERCIAL

## 2024-10-28 DIAGNOSIS — R19.5 ABNORMAL FECES: Primary | ICD-10-CM

## 2024-10-28 DIAGNOSIS — Z01.818 PREOP EXAMINATION: ICD-10-CM

## 2024-10-28 DIAGNOSIS — Z01.818 PREOP EXAMINATION: Primary | ICD-10-CM

## 2024-10-28 LAB
ALBUMIN SERPL-MCNC: 4.1 G/DL (ref 3.5–5)
ALBUMIN/GLOB SERPL: 1.1 RATIO (ref 1.1–2)
ALP SERPL-CCNC: 110 UNIT/L (ref 40–150)
ALT SERPL-CCNC: 24 UNIT/L (ref 0–55)
ANION GAP SERPL CALC-SCNC: 9 MEQ/L
APTT PPP: 30.9 SECONDS (ref 24.6–37.2)
AST SERPL-CCNC: 23 UNIT/L (ref 5–34)
BILIRUB SERPL-MCNC: 0.5 MG/DL
BUN SERPL-MCNC: 11 MG/DL (ref 9.8–20.1)
CALCIUM SERPL-MCNC: 9.6 MG/DL (ref 8.4–10.2)
CHLORIDE SERPL-SCNC: 106 MMOL/L (ref 98–107)
CO2 SERPL-SCNC: 26 MMOL/L (ref 22–29)
CREAT SERPL-MCNC: 1.16 MG/DL (ref 0.55–1.02)
CREAT/UREA NIT SERPL: 9
ERYTHROCYTE [DISTWIDTH] IN BLOOD BY AUTOMATED COUNT: 13.1 % (ref 11.5–17)
GFR SERPLBLD CREATININE-BSD FMLA CKD-EPI: 56 ML/MIN/1.73/M2
GLOBULIN SER-MCNC: 3.7 GM/DL (ref 2.4–3.5)
GLUCOSE SERPL-MCNC: 91 MG/DL (ref 74–100)
HCT VFR BLD AUTO: 42.6 % (ref 37–47)
HGB BLD-MCNC: 13.8 G/DL (ref 12–16)
INR PPP: 1
MCH RBC QN AUTO: 28.8 PG (ref 27–31)
MCHC RBC AUTO-ENTMCNC: 32.4 G/DL (ref 33–36)
MCV RBC AUTO: 88.8 FL (ref 80–94)
OHS QRS DURATION: 84 MS
OHS QTC CALCULATION: 462 MS
PLATELET # BLD AUTO: 229 X10(3)/MCL (ref 130–400)
PMV BLD AUTO: 11.7 FL (ref 7.4–10.4)
POTASSIUM SERPL-SCNC: 4.5 MMOL/L (ref 3.5–5.1)
PROT SERPL-MCNC: 7.8 GM/DL (ref 6.4–8.3)
PROTHROMBIN TIME: 13.9 SECONDS (ref 12.5–14.5)
RBC # BLD AUTO: 4.8 X10(6)/MCL (ref 4.2–5.4)
SODIUM SERPL-SCNC: 141 MMOL/L (ref 136–145)
WBC # BLD AUTO: 8.51 X10(3)/MCL (ref 4.5–11.5)

## 2024-10-28 PROCEDURE — 93010 ELECTROCARDIOGRAM REPORT: CPT | Mod: ,,, | Performed by: STUDENT IN AN ORGANIZED HEALTH CARE EDUCATION/TRAINING PROGRAM

## 2024-10-28 PROCEDURE — 80053 COMPREHEN METABOLIC PANEL: CPT

## 2024-10-28 PROCEDURE — 36415 COLL VENOUS BLD VENIPUNCTURE: CPT

## 2024-10-28 PROCEDURE — 85730 THROMBOPLASTIN TIME PARTIAL: CPT

## 2024-10-28 PROCEDURE — 85610 PROTHROMBIN TIME: CPT

## 2024-10-28 PROCEDURE — 93005 ELECTROCARDIOGRAM TRACING: CPT

## 2024-10-28 PROCEDURE — 85027 COMPLETE CBC AUTOMATED: CPT

## 2024-10-28 RX ORDER — TRAZODONE HYDROCHLORIDE 100 MG/1
100 TABLET ORAL NIGHTLY
COMMUNITY
Start: 2024-10-18

## 2024-10-28 NOTE — DISCHARGE INSTRUCTIONS
Follow prep on Wednesday. Clear liquids only. Nothing by mouth after midnight. Take Olmesartan AM of procedure with small sip of water.         INSTRUCTIONS  AFTER A COLONOSCOPY/EGD    NO DRIVING X 24 HOURS. NOTIFY YOUR DOCTOR WITH     ABDOMINAL PAIN UNRELIEVED BY  PASSING GAS,   FEVER WITHIN 24 HOURS, OR LARGE AMOUNT OF BLEEDING.

## 2024-10-30 ENCOUNTER — ANESTHESIA EVENT (OUTPATIENT)
Dept: SURGERY | Facility: HOSPITAL | Age: 54
End: 2024-10-30
Payer: COMMERCIAL

## 2024-10-30 RX ORDER — SODIUM CHLORIDE, SODIUM LACTATE, POTASSIUM CHLORIDE, CALCIUM CHLORIDE 600; 310; 30; 20 MG/100ML; MG/100ML; MG/100ML; MG/100ML
INJECTION, SOLUTION INTRAVENOUS CONTINUOUS
Status: CANCELLED | OUTPATIENT
Start: 2024-10-30

## 2024-10-31 ENCOUNTER — HOSPITAL ENCOUNTER (OUTPATIENT)
Facility: HOSPITAL | Age: 54
Discharge: HOME | End: 2024-10-31
Attending: INTERNAL MEDICINE | Admitting: INTERNAL MEDICINE
Payer: COMMERCIAL

## 2024-10-31 ENCOUNTER — ANESTHESIA (OUTPATIENT)
Dept: SURGERY | Facility: HOSPITAL | Age: 54
End: 2024-10-31
Payer: COMMERCIAL

## 2024-10-31 VITALS
TEMPERATURE: 98 F | OXYGEN SATURATION: 97 % | HEIGHT: 62 IN | HEART RATE: 91 BPM | DIASTOLIC BLOOD PRESSURE: 62 MMHG | BODY MASS INDEX: 34.6 KG/M2 | SYSTOLIC BLOOD PRESSURE: 92 MMHG | RESPIRATION RATE: 20 BRPM | WEIGHT: 188 LBS

## 2024-10-31 DIAGNOSIS — R19.5 ABNORMAL FECES: ICD-10-CM

## 2024-10-31 LAB

## 2024-10-31 PROCEDURE — 37000009 HC ANESTHESIA EA ADD 15 MINS: Performed by: INTERNAL MEDICINE

## 2024-10-31 PROCEDURE — 27201423 OPTIME MED/SURG SUP & DEVICES STERILE SUPPLY: Performed by: INTERNAL MEDICINE

## 2024-10-31 PROCEDURE — 87507 IADNA-DNA/RNA PROBE TQ 12-25: CPT | Performed by: INTERNAL MEDICINE

## 2024-10-31 PROCEDURE — 63600175 PHARM REV CODE 636 W HCPCS: Performed by: NURSE ANESTHETIST, CERTIFIED REGISTERED

## 2024-10-31 PROCEDURE — 45380 COLONOSCOPY AND BIOPSY: CPT | Performed by: INTERNAL MEDICINE

## 2024-10-31 PROCEDURE — 37000008 HC ANESTHESIA 1ST 15 MINUTES: Performed by: INTERNAL MEDICINE

## 2024-10-31 RX ORDER — FENTANYL CITRATE 50 UG/ML
INJECTION, SOLUTION INTRAMUSCULAR; INTRAVENOUS
Status: DISCONTINUED | OUTPATIENT
Start: 2024-10-31 | End: 2024-10-31

## 2024-10-31 RX ORDER — PROPOFOL 10 MG/ML
VIAL (ML) INTRAVENOUS
Status: DISCONTINUED | OUTPATIENT
Start: 2024-10-31 | End: 2024-10-31

## 2024-10-31 RX ORDER — LIDOCAINE HYDROCHLORIDE 20 MG/ML
INJECTION INTRAVENOUS
Status: DISCONTINUED | OUTPATIENT
Start: 2024-10-31 | End: 2024-10-31

## 2024-10-31 RX ORDER — LABETALOL HYDROCHLORIDE 5 MG/ML
INJECTION, SOLUTION INTRAVENOUS
Status: DISCONTINUED | OUTPATIENT
Start: 2024-10-31 | End: 2024-10-31

## 2024-10-31 RX ADMIN — PROPOFOL 50 MG: 10 INJECTION, EMULSION INTRAVENOUS at 11:10

## 2024-10-31 RX ADMIN — FENTANYL CITRATE 100 MCG: 50 INJECTION, SOLUTION INTRAMUSCULAR; INTRAVENOUS at 11:10

## 2024-10-31 RX ADMIN — LABETALOL HYDROCHLORIDE 10 MG: 5 INJECTION INTRAVENOUS at 11:10

## 2024-10-31 RX ADMIN — LIDOCAINE HYDROCHLORIDE 50 MG: 20 INJECTION, SOLUTION INTRAVENOUS at 11:10

## 2024-10-31 RX ADMIN — PROPOFOL 50 MG: 10 INJECTION, EMULSION INTRAVENOUS at 12:10

## 2024-10-31 RX ADMIN — PROPOFOL 100 MG: 10 INJECTION, EMULSION INTRAVENOUS at 11:10

## 2024-10-31 NOTE — OP NOTE
[facility] - Periop Services  Operative Note    Date of Procedure: 10/31/2024     Procedure: Procedure(s) (LRB):  COLONOSCOPY (N/A)  COLONOSCOPY, WITH 1 OR MORE BIOPSIES   Colonoscopy with pan biopsy    Surgeons and Role:     * Jourdan Vilchis III, MD - Primary    Assisting Surgeon: None    Pre-Operative Diagnosis: Abnormal feces [R19.5]  Loose stools/diarrhea    Post-Operative Diagnosis: Post-Op Diagnosis Codes:     * Abnormal feces [R19.5]  Suboptimal prep   Status post pan biopsy of the colon  Scant diverticular disease left side.  F [age]-year-old white female with    Endoscopic Specimens:  ID Type Source Tests Collected by Time Destination   A : BIOPSY TO RULE OUT MICROSCOPIC COLITIS Tissue Intestine Large, Ascending Colon SPECIMEN TO PATHOLOGY Jourdan Vilchis III, MD 10/31/2024 1158    B : BIOPSY TO RULE OUT MICROSCOPIC COLITIS Tissue Intestine Large, Transverse Colon SPECIMEN TO PATHOLOGY Jourdan Vilchis III, MD 10/31/2024 1203    C : BIOPSY TO RULE OUT MICROSCOPIC COLITIS Tissue Colon SPECIMEN TO PATHOLOGY Jourdan Vilchis III, MD 10/31/2024 1203    D : BIOPSY TO RULE OUT MICROSCOPIC COLITIS Tissue Intestine Large, Descending Colon SPECIMEN TO PATHOLOGY Jourdan Vilchis III, MD 10/31/2024 1203    E : BIOPSY TO RULE OUT MICROSCOPIC COLITIS Tissue Intestine Large, Sigmoid SPECIMEN TO PATHOLOGY Jourdan Vilchis III, MD 10/31/2024 1203    F : BIOPSY TO RULE OUT MICROSCOPIC COLITIS Tissue Rectum SPECIMEN TO PATHOLOGY Jourdan Vilchis III, MD 10/31/2024 1205          Anesthesia: General    Consent:  Patient was consented for the procedure at my office.  The risks and benefits of procedure explained in detail.  They were willing to undergo those risks.    HPI & Operative Findings (including complications, if any):  53-year-old white female with chronic diarrhea.  She was average colorectal risk.  She states that the diarrhea has been incessant.  She had some stool studies that were  performed in my office but not all ordered were performed..  Her diarrhea is persistent.  It is loose.  But it could not be performed for stool osmolality.  It was too solid.      Patient was brought down into the endoscopy room suite.  Laid in left lateral decubitus position.  Rectal exam was performed was within normal limits.      The Olympus adult colonoscope was then lubricated advanced to about 30-40 cm.  At that point I could not advance any further.  We had to put her in a supine position.  Then with expertise we are able to get to the right side ultimately.  She has a tortuous colon.    She had a suboptimal prep on the right side.  I could not intubate the terminal ileum but I saw that is orifice.  There were no masses no polyps no issues in the cecum.    An intra endoscopic GI PCR was taken and sent off in a specimen cup.  Additionally, biopsies were taken for pan biopsy to rule out microscopic colitis.  Jar A for the ascending, B for the transverse, C for the descending, D for the sigmoid eat for the rectum respectively.  No perforation no bleed.      There were no polyps or any mucosal changes with the exception of some mild diverticular disease in the left side.  The scope was used and retroflexion there were no abnormalities noted to the rectum on retroflexion.    Next para the scope was removed and patient tolerated the procedure well without any complications.    Discussion:     We will follow up on the biopsies and the stool studies closely.  Again, she was supposed to do some stool studies that my office but those were incomplete.  Indicating when I spoke to my nurse the stools were to solid to performed.  Considering also her medications in the differential.  But rule out infection 1st.  Would repeat colonoscopy in 5 years pending pathology and further workup.  Unless clinically indicated sooner.      Blood Loss (EBL): 0 mL           Implants: * No implants in log *    Specimens:   Specimen (24h  ago, onward)       Start     Ordered    10/31/24 1210  Specimen to Pathology  RELEASE UPON ORDERING        References:    Click here for ordering Quick Tip   Question:  Release to patient  Answer:  Immediate    10/31/24 1210                            Condition: Stable for Discharge    Disposition: Home or Self Care        Discharge Note    OUTCOME: Patient tolerated treatment/procedure well without complication and is now ready for discharge.    DISPOSITION: Home or Self Care    FINAL DIAGNOSIS:  <principal problem not specified>    FOLLOWUP: Follow up in clinic in 1-2 weeks to review biopsies    DISCHARGE INSTRUCTIONS:  No discharge procedures on file.

## 2024-11-04 LAB — PSYCHE PATHOLOGY RESULT: NORMAL

## 2024-12-20 ENCOUNTER — HOSPITAL ENCOUNTER (OUTPATIENT)
Dept: RADIOLOGY | Facility: HOSPITAL | Age: 54
Discharge: HOME | End: 2024-12-20
Attending: FAMILY MEDICINE
Payer: COMMERCIAL

## 2024-12-20 DIAGNOSIS — M54.50 LOW BACK PAIN: ICD-10-CM

## 2024-12-20 DIAGNOSIS — M54.50 LOW BACK PAIN: Primary | ICD-10-CM

## 2024-12-20 PROCEDURE — 72148 MRI LUMBAR SPINE W/O DYE: CPT | Mod: TC

## 2025-01-02 DIAGNOSIS — R10.2 PELVIC PAIN SYNDROME: Primary | ICD-10-CM

## 2025-05-23 DIAGNOSIS — R10.2 PELVIC PAIN SYNDROME: Primary | ICD-10-CM

## 2025-05-27 ENCOUNTER — HOSPITAL ENCOUNTER (OUTPATIENT)
Dept: RADIOLOGY | Facility: HOSPITAL | Age: 55
Discharge: HOME | End: 2025-05-27
Attending: FAMILY MEDICINE
Payer: COMMERCIAL

## 2025-05-27 DIAGNOSIS — R10.2 PELVIC PAIN SYNDROME: ICD-10-CM

## 2025-05-27 DIAGNOSIS — Z87.891 PERSONAL HISTORY OF TOBACCO USE, PRESENTING HAZARDS TO HEALTH: Primary | ICD-10-CM

## 2025-05-27 PROCEDURE — 76856 US EXAM PELVIC COMPLETE: CPT | Mod: TC

## 2025-05-30 ENCOUNTER — HOSPITAL ENCOUNTER (OUTPATIENT)
Dept: RADIOLOGY | Facility: HOSPITAL | Age: 55
Discharge: HOME | End: 2025-05-30
Attending: FAMILY MEDICINE
Payer: COMMERCIAL

## 2025-05-30 DIAGNOSIS — Z12.2 SCREENING FOR MALIGNANT NEOPLASM OF RESPIRATORY ORGAN: ICD-10-CM

## 2025-05-30 PROCEDURE — 71271 CT THORAX LUNG CANCER SCR C-: CPT | Mod: TC

## 2025-06-05 ENCOUNTER — OFFICE VISIT (OUTPATIENT)
Dept: OBSTETRICS AND GYNECOLOGY | Facility: CLINIC | Age: 55
End: 2025-06-05
Payer: COMMERCIAL

## 2025-06-05 VITALS
SYSTOLIC BLOOD PRESSURE: 110 MMHG | BODY MASS INDEX: 34.01 KG/M2 | HEIGHT: 62 IN | DIASTOLIC BLOOD PRESSURE: 82 MMHG | WEIGHT: 184.81 LBS

## 2025-06-05 DIAGNOSIS — Z01.419 ROUTINE GYNECOLOGICAL EXAMINATION: Primary | ICD-10-CM

## 2025-06-05 DIAGNOSIS — Z12.31 BREAST CANCER SCREENING BY MAMMOGRAM: ICD-10-CM

## 2025-06-05 RX ORDER — CARVEDILOL 6.25 MG/1
6.25 TABLET ORAL 2 TIMES DAILY
COMMUNITY
Start: 2025-06-03

## 2025-06-05 RX ORDER — VILAZODONE HYDROCHLORIDE 40 MG/1
40 TABLET ORAL
COMMUNITY
Start: 2025-06-03

## 2025-06-05 RX ORDER — ONDANSETRON 8 MG/1
8 TABLET, FILM COATED ORAL EVERY 8 HOURS PRN
Qty: 30 TABLET | Refills: 0 | Status: SHIPPED | OUTPATIENT
Start: 2025-06-05

## 2025-06-05 RX ORDER — PREDNISONE 10 MG/1
TABLET ORAL
COMMUNITY
Start: 2025-05-21 | End: 2025-06-05

## 2025-06-12 ENCOUNTER — HOSPITAL ENCOUNTER (OUTPATIENT)
Dept: RADIOLOGY | Facility: HOSPITAL | Age: 55
Discharge: HOME | End: 2025-06-12
Attending: NURSE PRACTITIONER
Payer: COMMERCIAL

## 2025-06-12 DIAGNOSIS — Z12.31 BREAST CANCER SCREENING BY MAMMOGRAM: ICD-10-CM

## 2025-06-12 PROCEDURE — 77063 BREAST TOMOSYNTHESIS BI: CPT | Mod: TC

## 2025-06-16 ENCOUNTER — RESULTS FOLLOW-UP (OUTPATIENT)
Dept: OBSTETRICS AND GYNECOLOGY | Facility: CLINIC | Age: 55
End: 2025-06-16

## 2025-09-04 ENCOUNTER — HOSPITAL ENCOUNTER (OUTPATIENT)
Dept: RADIOLOGY | Facility: HOSPITAL | Age: 55
Discharge: HOME | End: 2025-09-04
Attending: FAMILY MEDICINE
Payer: COMMERCIAL

## 2025-09-04 DIAGNOSIS — R05.1 ACUTE COUGH: ICD-10-CM

## 2025-09-04 PROCEDURE — 71046 X-RAY EXAM CHEST 2 VIEWS: CPT | Mod: TC

## (undated) DEVICE — FORCEP CAPTURA PRO SPK 230CM

## (undated) DEVICE — BITE BLOCK ADULT LATEX FREE

## (undated) DEVICE — KIT SURGICAL COLON .25 1.1OZ

## (undated) DEVICE — TRAP SUCTION POLYP